# Patient Record
Sex: FEMALE | Race: WHITE | NOT HISPANIC OR LATINO | Employment: STUDENT | URBAN - METROPOLITAN AREA
[De-identification: names, ages, dates, MRNs, and addresses within clinical notes are randomized per-mention and may not be internally consistent; named-entity substitution may affect disease eponyms.]

---

## 2017-10-20 ENCOUNTER — OFFICE VISIT (OUTPATIENT)
Dept: OBSTETRICS AND GYNECOLOGY | Age: 22
End: 2017-10-20

## 2017-10-20 VITALS
WEIGHT: 164 LBS | BODY MASS INDEX: 23.48 KG/M2 | SYSTOLIC BLOOD PRESSURE: 118 MMHG | DIASTOLIC BLOOD PRESSURE: 80 MMHG | HEIGHT: 70 IN

## 2017-10-20 DIAGNOSIS — Z01.419 WELL WOMAN EXAM WITH ROUTINE GYNECOLOGICAL EXAM: Primary | ICD-10-CM

## 2017-10-20 DIAGNOSIS — Z11.3 SCREEN FOR STD (SEXUALLY TRANSMITTED DISEASE): ICD-10-CM

## 2017-10-20 DIAGNOSIS — Z30.011 ENCOUNTER FOR INITIAL PRESCRIPTION OF CONTRACEPTIVE PILLS: ICD-10-CM

## 2017-10-20 PROCEDURE — 99385 PREV VISIT NEW AGE 18-39: CPT | Performed by: OBSTETRICS & GYNECOLOGY

## 2017-10-20 RX ORDER — SERTRALINE HYDROCHLORIDE 25 MG/1
25 TABLET, FILM COATED ORAL DAILY
COMMUNITY
End: 2018-05-15

## 2017-10-20 NOTE — PROGRESS NOTES
Chief complaint:annual  Subjective   History of Present Illness    Neva Whitaker is a 21 y.o.  who presents for annual exam. New Gyn apt.  Her menses are regular every 28-30 days, lasting 7 days. +menorrhagia for 3 days. +dysmenorrhea. Sexually active, uses condoms for contraception.  S/p Gardasil vaccine x3    Soc Hx- from [place], [place]. College student at [employer], psychology major, plans physical therapy program    Obstetric History:  OB History      Para Term  AB Living    0 0 0 0 0 0    SAB TAB Ectopic Multiple Live Births    0 0 0 0 0         Menstrual History:  Menarche age: 11 years  Patient's last menstrual period was 10/02/2017 (approximate).         Current contraception: condoms  History of abnormal Pap smear: no  Received Gardasil immunization: yes -    Perform regular self breast exam: yes -    Family history of uterine or ovarian cancer: no  Family History of colon cancer: no  Family history of breast cancer: no    Mammogram: not indicated.  Colonoscopy: not indicated.  DEXA: not indicated.    Exercise: very active  Calcium/Vitamin D: adequate intake    The following portions of the patient's history were reviewed and updated as appropriate: allergies, current medications, past family history, past medical history, past social history, past surgical history and problem list.    Review of Systems   Constitutional: Negative for activity change, fatigue, fever and unexpected weight change.   Respiratory: Negative for chest tightness and shortness of breath.    Cardiovascular: Negative for chest pain, palpitations and leg swelling.   Gastrointestinal: Negative for abdominal distention, abdominal pain, blood in stool, constipation, diarrhea, nausea and vomiting.   Endocrine: Negative for cold intolerance, heat intolerance, polydipsia, polyphagia and polyuria.   Genitourinary: Negative.    Musculoskeletal: Negative for arthralgias and back pain.   Skin: Negative for color change.  "  Neurological: Negative for weakness and headaches.   Hematological: Does not bruise/bleed easily.   Psychiatric/Behavioral: Negative for confusion.       Pertinent items are noted in HPI.     Objective   Physical Exam    /80  Ht 70\" (177.8 cm)  Wt 164 lb (74.4 kg)  LMP 10/02/2017 (Approximate)  BMI 23.53 kg/m2    General:   alert, appears stated age and cooperative   Neck: no asymmetry, masses, or scars   Heart: regular rate and rhythm, S1, S2 normal, no murmur, click, rub or gallop   Lungs: clear to auscultation bilaterally   Abdomen: soft, non-tender, without masses or organomegaly   Breast: inspection negative, no nipple discharge or bleeding, no masses or nodularity palpable   Vulva: normal, Bartholin's, Urethra, [place]'s normal   Vagina: normal mucosa   Cervix: no bleeding following Pap, no cervical motion tenderness, no lesions and nulliparous appearance   Uterus: normal size, anteverted, non-tender, normal shape and consistency   Adnexa: normal adnexa and no mass, fullness, tenderness   Rectal: not indicated     Assessment/Plan   Neva was seen today for gynecologic exam.    Diagnoses and all orders for this visit:    Well woman exam with routine gynecological exam  -     Pap Lb, Ct-Ng, rfx HPV ASCU - ThinPrep Vial, Cervix, Endocervix    Encounter for initial prescription of contraceptive pills  -     Norethin-Eth Estrad-Fe Biphas 1 MG-10 MCG / 10 MCG tablet; Take 1 tablet by mouth Daily.    Screen for STD (sexually transmitted disease)  -     Pap Lb, Ct-Ng, rfx HPV ASCU - ThinPrep Vial, Cervix, Endocervix        Breast self exam technique reviewed and patient encouraged to perform self-exam monthly.  Discussed healthy lifestyle modifications.  Pap smear sent                 "

## 2017-10-24 LAB
C TRACH RRNA CVX QL NAA+PROBE: NEGATIVE
CONV .: NORMAL
CYTOLOGIST CVX/VAG CYTO: NORMAL
DX ICD CODE: NORMAL
HIV 1 & 2 AB SER-IMP: NORMAL
N GONORRHOEA RRNA CVX QL NAA+PROBE: NEGATIVE
OTHER STN SPEC: NORMAL
PATH REPORT.FINAL DX SPEC: NORMAL
STAT OF ADQ CVX/VAG CYTO-IMP: NORMAL

## 2017-12-05 ENCOUNTER — OFFICE VISIT (OUTPATIENT)
Dept: OBSTETRICS AND GYNECOLOGY | Age: 22
End: 2017-12-05

## 2017-12-05 VITALS
SYSTOLIC BLOOD PRESSURE: 110 MMHG | BODY MASS INDEX: 23.77 KG/M2 | DIASTOLIC BLOOD PRESSURE: 70 MMHG | HEIGHT: 70 IN | WEIGHT: 166 LBS

## 2017-12-05 DIAGNOSIS — Z30.09 BIRTH CONTROL COUNSELING: Primary | ICD-10-CM

## 2017-12-05 PROCEDURE — 99213 OFFICE O/P EST LOW 20 MIN: CPT | Performed by: NURSE PRACTITIONER

## 2017-12-05 RX ORDER — MISOPROSTOL 200 UG/1
TABLET ORAL
Qty: 1 TABLET | Refills: 0 | Status: SHIPPED | OUTPATIENT
Start: 2017-12-05 | End: 2017-12-13

## 2017-12-05 NOTE — PROGRESS NOTES
"Subjective   Neva Whitaker is a 22 y.o. female is being seen today for   Chief Complaint   Patient presents with   • Follow-up     Discuss birth control pills   .    History of Present Illness     Patient started on OCPs in October but stopped taking them and is here to discuss other options.  She didn't like how they made her feel and her periods were irregular on them.  She needs good birth control but doesn't want something she has to take daily. Her mother suggested the IUD and she is very interested in that.  She has one current, new partner and is agreeable to STD testing today but prefers not to have to have an exam.  She did have an AE in October and a pap as well and did fine with the exam.    The following portions of the patient's history were reviewed and updated as appropriate: allergies, current medications, past family history, past medical history, past social history, past surgical history and problem list.    /70  Ht 177.8 cm (70\")  Wt 75.3 kg (166 lb)  LMP 11/06/2017  BMI 23.82 kg/m2      Review of Systems   Constitutional: Negative.    HENT: Negative.    Eyes: Negative.    Respiratory: Negative.    Cardiovascular: Negative.    Gastrointestinal: Negative.    Endocrine: Negative.    Genitourinary: Negative.    Musculoskeletal: Negative.    Skin: Negative.    Allergic/Immunologic: Negative.    Neurological: Negative.    Hematological: Negative.    Psychiatric/Behavioral: Negative.        Objective   Physical Exam   Constitutional: She is oriented to person, place, and time. She appears well-developed and well-nourished.   Pulmonary/Chest: Effort normal.   Abdominal: Soft. Bowel sounds are normal.   Neurological: She is alert and oriented to person, place, and time.   Skin: Skin is warm and dry.   Psychiatric: She has a normal mood and affect. Her behavior is normal.         Assessment/Plan   Neva was seen today for follow-up.    Diagnoses and all orders for this visit:    Birth control " counseling  -     Chlamydia trachomatis, Neisseria gonorrhoeae, PCR w/ confirmation - Urine, Urine, Clean Catch    Other orders  -     misoprostol (CYTOTEC) 200 MCG tablet; Insert 1 tablet into the vagina the night before procedure.      Discussed risks/benefits of IUDs and detailed procedure.  She would like cytotec prior to insertion.  Understands she needs to abstain from unprotected IC x 2 weeks prior to insertion.  GC/CHL sent today.  Can return with menses for insertion.

## 2017-12-13 ENCOUNTER — OFFICE VISIT (OUTPATIENT)
Dept: OBSTETRICS AND GYNECOLOGY | Age: 22
End: 2017-12-13

## 2017-12-13 ENCOUNTER — PROCEDURE VISIT (OUTPATIENT)
Dept: OBSTETRICS AND GYNECOLOGY | Age: 22
End: 2017-12-13

## 2017-12-13 VITALS
SYSTOLIC BLOOD PRESSURE: 110 MMHG | WEIGHT: 169 LBS | DIASTOLIC BLOOD PRESSURE: 68 MMHG | HEIGHT: 70 IN | BODY MASS INDEX: 24.2 KG/M2

## 2017-12-13 DIAGNOSIS — Z30.430 ENCOUNTER FOR IUD INSERTION: Primary | ICD-10-CM

## 2017-12-13 DIAGNOSIS — Z30.431 IUD CHECK UP: Primary | ICD-10-CM

## 2017-12-13 LAB
B-HCG UR QL: NEGATIVE
INTERNAL NEGATIVE CONTROL: NEGATIVE
INTERNAL POSITIVE CONTROL: POSITIVE
Lab: NORMAL

## 2017-12-13 PROCEDURE — 58300 INSERT INTRAUTERINE DEVICE: CPT | Performed by: PHYSICIAN ASSISTANT

## 2017-12-13 PROCEDURE — 76830 TRANSVAGINAL US NON-OB: CPT | Performed by: PHYSICIAN ASSISTANT

## 2017-12-13 PROCEDURE — 81025 URINE PREGNANCY TEST: CPT | Performed by: PHYSICIAN ASSISTANT

## 2017-12-13 NOTE — PROGRESS NOTES
IUD Insertion    Patient's last menstrual period was 12/09/2017 (exact date).    Date of procedure:  12/13/2017    Risks and benefits discussed? yes  All questions answered? yes  Consents given by The patient  Written consent obtained? yes    Procedure documentation:     Urine pregnancy test was done today and result was negative.  The risks (including infection,  bleeding, pain, and uterine perforation) and benefits of the procedure were explained to the patient and Written informed consent was  obtained.    After verifying the patient had a low probability of being pregnant and met the criteria for insertion, a sterile speculum has placed and the cervix was cleansed with an antiseptic solution.  Vaginal discharge was scant.  The anterior lip of the cervix was grasped with an simran and the uterine cavity was gently sounded. There was no difficulty passing the sound through the cervix.  Cervical dilation did not need to be performed prior to placing the IUD.  The uterus was anteverted and sounded to 8 cms.  The Kyleena was then prepared per the manufacturers instructions.    The Kyleena was advanced to a point 2 cms from the fundus and then the arms were released from the sheath.  The device was advanced to the fundus and the device was released fully from the sheath.. The string was cut 2 cms in length.  Bleeding from the cervix was scant.    She tolerated the procedure without any difficulty.    Post procedure instructions: The patient was advised to call for any fever or for prolonged or severe pain or bleeding.. Enc 2 wks of pelvic rest as well. And enc condoms when SA    Follow up needed: 6 weeks for IUD check    U/S done to confirm placement , IUD in cavity. Pt had some lightheadedness occur while checking out. Ibuprofen and sprite were given and pt rested.

## 2017-12-15 ENCOUNTER — TELEPHONE (OUTPATIENT)
Dept: OBSTETRICS AND GYNECOLOGY | Age: 22
End: 2017-12-15

## 2017-12-15 LAB
C TRACH RRNA SPEC QL NAA+PROBE: POSITIVE
C TRACH RRNA SPEC QL NAA+PROBE: POSITIVE
N GONORRHOEA RRNA SPEC QL NAA+PROBE: NEGATIVE

## 2017-12-15 RX ORDER — AZITHROMYCIN 500 MG/1
1000 TABLET, FILM COATED ORAL ONCE
Qty: 2 TABLET | Refills: 0 | Status: SHIPPED | OUTPATIENT
Start: 2017-12-15 | End: 2017-12-15

## 2017-12-15 RX ORDER — AZITHROMYCIN 500 MG/1
1000 TABLET, FILM COATED ORAL DAILY
Qty: 2 TABLET | Refills: 0 | Status: SHIPPED | OUTPATIENT
Start: 2017-12-15 | End: 2018-01-05

## 2017-12-15 NOTE — TELEPHONE ENCOUNTER
----- Message from MAIKEL Kim sent at 12/15/2017  4:24 PM EST -----  Notify + chlamydia.  It is very important to treat especially since she has an IUD.  COnfirm no allergies and I will send in azithromycin for her to take.  Her partner needs to be treated as well and they should abstain from IC x 1 week.  RTO in 3-4 weeks for MARIEL.

## 2017-12-15 NOTE — TELEPHONE ENCOUNTER
Pt.notified of results and that a rx will be sent to the pharmacy.She will return in 3-4 wks for MARIEL  appt.

## 2018-01-05 ENCOUNTER — OFFICE VISIT (OUTPATIENT)
Dept: OBSTETRICS AND GYNECOLOGY | Age: 23
End: 2018-01-05

## 2018-01-05 VITALS — SYSTOLIC BLOOD PRESSURE: 108 MMHG | DIASTOLIC BLOOD PRESSURE: 70 MMHG

## 2018-01-05 DIAGNOSIS — Z30.431 IUD CHECK UP: ICD-10-CM

## 2018-01-05 DIAGNOSIS — Z20.2 CHLAMYDIA CONTACT, TREATED: Primary | ICD-10-CM

## 2018-01-05 DIAGNOSIS — Z20.2 TRICHOMONAS CONTACT, TREATED: ICD-10-CM

## 2018-01-05 PROCEDURE — 99213 OFFICE O/P EST LOW 20 MIN: CPT | Performed by: PHYSICIAN ASSISTANT

## 2018-01-05 NOTE — PROGRESS NOTES
Subjective     Chief Complaint   Patient presents with   • Follow-up     mariel       Neva Whitaker is a 22 y.o.  whose LMP is Patient's last menstrual period was 2017 (exact date). presents for her MARIEL. She tested + for chlamydia and trich. She had this test done on the 5th Dec but then had her IUD placed on the  and the results were not back yet. They resulted on the . She was given meds and took them and told her partner. She was not and is not sxatic. Denies pelvic pain or fever. She did have some cramping but that was r/t the IUD. She does admit to multiple partners but says she is monogamous currently.      She is a pt of Dr Donohue. She is utd on her annual.        No Additional Complaints Reported    The following portions of the patient's history were reviewed and updated as appropriate:vital signs, allergies, current medications, past family history, past medical history, past social history, past surgical history and problem list      Review of Systems   A comprehensive review of systems was negative except for: recent +trich and +chlamydia     Objective      /70  LMP 2017 (Exact Date)    Physical Exam    General:   alert, comfortable and no distress   Heart: Not performed today   Lungs: Not performed today.   Breast: Not performed today   Neck: na   Abdomen: {Not performed today   CVA: Not performed today   Pelvis: External genitalia: normal general appearance  Vaginal: discharge, white   Cervix: IUD string visualized   Extremities: Not performed today   Neurologic: negative   Psychiatric: Normal affect, judgement, and mood       Lab Review   Labs: +trich/chlamydia     Imaging   No data reviewed    Assessment/Plan     ASSESSMENT  1. Chlamydia contact, treated    2. Trichomonas contact, treated    3. IUD check up        PLAN  1.   Orders Placed This Encounter   Procedures   • Chlamydia trachomatis, Neisseria gonorrhoeae, Trichomonas vaginalis, PCR - Swab, Vagina   • RPR   •  Hepatitis B surface antigen   • Hepatitis C antibody   • HIV-1 / O / 2 Ag / Antibody 4th Generation       2. Disc STD risk and encouraged her to be monogamous and use condoms when SA. If she plans to be with one partner, then encourage testing on both sides to confirm no STD's prior to intercourse.  Pt agreeable to this POC.      3. IUD in place, f/u prn or if any problems        Follow up: 9 month(s)    SHERMAN Murrell  1/5/2018

## 2018-01-06 LAB
HBV SURFACE AG SERPL QL IA: NEGATIVE
HCV AB S/CO SERPL IA: <0.1 S/CO RATIO (ref 0–0.9)
HIV 1+2 AB+HIV1 P24 AG SERPL QL IA: NON REACTIVE
RPR SER QL: NORMAL

## 2018-01-08 ENCOUNTER — TELEPHONE (OUTPATIENT)
Dept: OBSTETRICS AND GYNECOLOGY | Age: 23
End: 2018-01-08

## 2018-01-08 LAB
C TRACH RRNA SPEC QL NAA+PROBE: NEGATIVE
N GONORRHOEA RRNA SPEC QL NAA+PROBE: NEGATIVE
T VAGINALIS RRNA SPEC QL NAA+PROBE: NEGATIVE

## 2018-01-08 NOTE — TELEPHONE ENCOUNTER
----- Message from SHERMAN Rothman sent at 1/8/2018  1:26 PM EST -----  Let her know her STD results are negative

## 2018-05-15 ENCOUNTER — OFFICE VISIT (OUTPATIENT)
Dept: OBSTETRICS AND GYNECOLOGY | Age: 23
End: 2018-05-15

## 2018-05-15 VITALS
BODY MASS INDEX: 25.05 KG/M2 | SYSTOLIC BLOOD PRESSURE: 122 MMHG | WEIGHT: 175 LBS | HEIGHT: 70 IN | DIASTOLIC BLOOD PRESSURE: 72 MMHG

## 2018-05-15 DIAGNOSIS — N94.9 VAGINAL BURNING: Primary | ICD-10-CM

## 2018-05-15 DIAGNOSIS — Z11.3 SCREEN FOR STD (SEXUALLY TRANSMITTED DISEASE): ICD-10-CM

## 2018-05-15 LAB
BILIRUB BLD-MCNC: NEGATIVE MG/DL
CLARITY, POC: CLEAR
COLOR UR: YELLOW
GLUCOSE UR STRIP-MCNC: NEGATIVE MG/DL
KETONES UR QL: NEGATIVE
LEUKOCYTE EST, POC: NEGATIVE
NITRITE UR-MCNC: NEGATIVE MG/ML
PH UR: 5.5 [PH] (ref 5–8)
PROT UR STRIP-MCNC: NEGATIVE MG/DL
RBC # UR STRIP: NEGATIVE /UL
SP GR UR: 1.02 (ref 1–1.03)
UROBILINOGEN UR QL: NORMAL

## 2018-05-15 PROCEDURE — 99213 OFFICE O/P EST LOW 20 MIN: CPT | Performed by: PHYSICIAN ASSISTANT

## 2018-05-15 PROCEDURE — 81002 URINALYSIS NONAUTO W/O SCOPE: CPT | Performed by: PHYSICIAN ASSISTANT

## 2018-05-15 NOTE — PROGRESS NOTES
"Subjective     Chief Complaint   Patient presents with   • Vaginitis     c/o itching and burning after intercourse., wants to make sure that she is ok. used refresh and did get a little better.       Neva Whitaker is a 22 y.o.  whose LMP is Patient's last menstrual period was 2018 (approximate). presents with one time of vaginal itching. It happened after a shower but she also notes that she has a new partner and has not been using a condom so she would like to have STD testing done.    She denies any med changes. She is home from school and working at [employer] in [place]      No Additional Complaints Reported    The following portions of the patient's history were reviewed and updated as appropriate:vital signs, allergies, current medications, past family history, past medical history, past social history, past surgical history and problem list      Review of Systems   Genitourinary:positive for vaginal itch     Objective      /72   Ht 177.8 cm (70\")   Wt 79.4 kg (175 lb)   LMP 2018 (Approximate)   BMI 25.11 kg/m²     Physical Exam    General:   alert, comfortable and no distress   Heart: Not performed today   Lungs: Not performed today.   Breast: Not performed today   Neck: na   Abdomen: {Not performed today   CVA: Not performed today   Pelvis: Vaginal: normal mucosa without prolapse or lesions and scant white d/ct noted  Cervix: IUD string visualized   Extremities: Not performed today   Neurologic: negative   Psychiatric: Normal affect, judgement, and mood       Lab Review   Labs: No data reviewed     Imaging   No data reviewed    Assessment/Plan     ASSESSMENT  1. Vaginal burning    2. Screen for STD (sexually transmitted disease)        PLAN  1.   Orders Placed This Encounter   Procedures   • NuSwab VG+ - Swab, Vagina   • POC Urinalysis Dipstick, Multipro       2. Plan cultures first. Re emphasized need for condoms.      Follow up: 7 month(s)    An Gagnon, " PA  5/15/2018

## 2018-05-20 LAB
A VAGINAE DNA VAG QL NAA+PROBE: ABNORMAL SCORE
BVAB2 DNA VAG QL NAA+PROBE: ABNORMAL SCORE
C ALBICANS DNA VAG QL NAA+PROBE: POSITIVE
C GLABRATA DNA VAG QL NAA+PROBE: NEGATIVE
C TRACH RRNA SPEC QL NAA+PROBE: NEGATIVE
MEGA1 DNA VAG QL NAA+PROBE: ABNORMAL SCORE
N GONORRHOEA RRNA SPEC QL NAA+PROBE: NEGATIVE
T VAGINALIS RRNA SPEC QL NAA+PROBE: NEGATIVE

## 2018-05-21 ENCOUNTER — TELEPHONE (OUTPATIENT)
Dept: OBSTETRICS AND GYNECOLOGY | Age: 23
End: 2018-05-21

## 2018-05-21 RX ORDER — FLUCONAZOLE 150 MG/1
150 TABLET ORAL ONCE
Qty: 2 TABLET | Refills: 0 | Status: SHIPPED | OUTPATIENT
Start: 2018-05-21 | End: 2018-07-14 | Stop reason: SDUPTHER

## 2018-05-21 RX ORDER — METRONIDAZOLE 500 MG/1
500 TABLET ORAL 2 TIMES DAILY
Qty: 14 TABLET | Refills: 0 | Status: SHIPPED | OUTPATIENT
Start: 2018-05-21 | End: 2018-07-14 | Stop reason: SDUPTHER

## 2018-05-21 NOTE — TELEPHONE ENCOUNTER
----- Message from SHERMAN Rothman sent at 5/21/2018  9:08 AM EDT -----  Let her know her vaginal swab is positive for yeast and BV, but neg for STD's. I sent in meds

## 2018-07-16 ENCOUNTER — TELEPHONE (OUTPATIENT)
Dept: OBSTETRICS AND GYNECOLOGY | Age: 23
End: 2018-07-16

## 2018-07-16 RX ORDER — METRONIDAZOLE 7.5 MG/G
GEL VAGINAL
Qty: 70 G | Refills: 0 | Status: SHIPPED | OUTPATIENT
Start: 2018-07-16 | End: 2020-02-07

## 2018-07-16 RX ORDER — METRONIDAZOLE 500 MG/1
TABLET ORAL
Qty: 14 TABLET | Refills: 0 | Status: SHIPPED | OUTPATIENT
Start: 2018-07-16 | End: 2018-07-16 | Stop reason: SINTOL

## 2018-07-16 RX ORDER — FLUCONAZOLE 150 MG/1
150 TABLET ORAL ONCE
Qty: 2 TABLET | Refills: 0 | Status: SHIPPED | OUTPATIENT
Start: 2018-07-16 | End: 2018-07-16

## 2018-07-16 NOTE — TELEPHONE ENCOUNTER
Dr Lazcano pt, was given metronidazole that made her feel sick all the time, pt requesting we send in Metro Gel to pharm on file, sumeet

## 2018-07-23 ENCOUNTER — TELEPHONE (OUTPATIENT)
Dept: OBSTETRICS AND GYNECOLOGY | Age: 23
End: 2018-07-23

## 2018-07-23 NOTE — TELEPHONE ENCOUNTER
Dr Lazcano pt, had IUD inserted in Dec 2017, exp cramping mild and hot flashes and back ache, this has never happened before, LMP ?2 mos ago?  Took plan B, pt could not tell me when ?over a month ago, did a preg test 2 wks ago - negative. Also is on Metrogel, how long to wait before having intercourse. Please advise.

## 2018-07-23 NOTE — TELEPHONE ENCOUNTER
Complete metrogel before attempting IC.  As for the cramping and hot flashes, could be a SE of the IUD but pretty unusual.  Have her check her strings, r/o pregnancy and monitor s/s. If they persist, we can plan a problem visit with u/s to confirm location and possibly order some labs as well

## 2019-11-25 ENCOUNTER — TELEPHONE (OUTPATIENT)
Dept: OBSTETRICS AND GYNECOLOGY | Age: 24
End: 2019-11-25

## 2019-11-25 RX ORDER — METRONIDAZOLE 500 MG/1
500 TABLET ORAL 2 TIMES DAILY
Qty: 14 TABLET | Refills: 0 | Status: SHIPPED | OUTPATIENT
Start: 2019-11-25 | End: 2019-12-02

## 2020-02-07 ENCOUNTER — OFFICE VISIT (OUTPATIENT)
Dept: OBSTETRICS AND GYNECOLOGY | Age: 25
End: 2020-02-07

## 2020-02-07 VITALS
SYSTOLIC BLOOD PRESSURE: 110 MMHG | WEIGHT: 178.4 LBS | DIASTOLIC BLOOD PRESSURE: 62 MMHG | BODY MASS INDEX: 25.54 KG/M2 | HEIGHT: 70 IN

## 2020-02-07 DIAGNOSIS — Z11.3 SCREEN FOR STD (SEXUALLY TRANSMITTED DISEASE): ICD-10-CM

## 2020-02-07 DIAGNOSIS — Z01.419 WELL WOMAN EXAM WITH ROUTINE GYNECOLOGICAL EXAM: Primary | ICD-10-CM

## 2020-02-07 DIAGNOSIS — B96.89 BV (BACTERIAL VAGINOSIS): ICD-10-CM

## 2020-02-07 DIAGNOSIS — N76.0 BV (BACTERIAL VAGINOSIS): ICD-10-CM

## 2020-02-07 PROCEDURE — 99395 PREV VISIT EST AGE 18-39: CPT | Performed by: OBSTETRICS & GYNECOLOGY

## 2020-02-07 RX ORDER — CLINDAMYCIN PHOSPHATE 100 MG/5G
CREAM VAGINAL
Qty: 5 G | Refills: 2 | Status: SHIPPED | OUTPATIENT
Start: 2020-02-07 | End: 2021-04-09

## 2020-02-07 RX ORDER — TINIDAZOLE 500 MG/1
TABLET ORAL
Qty: 10 TABLET | Refills: 3 | Status: SHIPPED | OUTPATIENT
Start: 2020-02-07 | End: 2020-09-17

## 2020-02-11 LAB
C TRACH RRNA CVX QL NAA+PROBE: NEGATIVE
CONV .: NORMAL
CYTOLOGIST CVX/VAG CYTO: NORMAL
CYTOLOGY CVX/VAG DOC CYTO: NORMAL
CYTOLOGY CVX/VAG DOC THIN PREP: NORMAL
DX ICD CODE: NORMAL
HIV 1 & 2 AB SER-IMP: NORMAL
N GONORRHOEA RRNA CVX QL NAA+PROBE: NEGATIVE
OTHER STN SPEC: NORMAL
STAT OF ADQ CVX/VAG CYTO-IMP: NORMAL

## 2020-02-12 ENCOUNTER — TELEPHONE (OUTPATIENT)
Dept: OBSTETRICS AND GYNECOLOGY | Age: 25
End: 2020-02-12

## 2020-03-02 ENCOUNTER — TELEPHONE (OUTPATIENT)
Dept: OBSTETRICS AND GYNECOLOGY | Age: 25
End: 2020-03-02

## 2020-03-02 NOTE — TELEPHONE ENCOUNTER
(Sheron pt) Pt was suppose to have a medication called in boric acid. Is this something we can send in?     [phone]

## 2020-03-04 NOTE — TELEPHONE ENCOUNTER
She has to go to a compounding pharmacy for this. I usually give a paper prescription for that at the time of the visit if she is considering trying this. [facility] close to our office is a compounding pharmacy. If she doesn't have the paper prescription, we can call it in there for her if she wants to try it.

## 2020-09-14 ENCOUNTER — OFFICE VISIT (OUTPATIENT)
Dept: OBSTETRICS AND GYNECOLOGY | Age: 25
End: 2020-09-14

## 2020-09-14 VITALS
DIASTOLIC BLOOD PRESSURE: 74 MMHG | SYSTOLIC BLOOD PRESSURE: 126 MMHG | HEIGHT: 70 IN | WEIGHT: 173 LBS | BODY MASS INDEX: 24.77 KG/M2

## 2020-09-14 DIAGNOSIS — Z11.3 SCREEN FOR STD (SEXUALLY TRANSMITTED DISEASE): ICD-10-CM

## 2020-09-14 DIAGNOSIS — Z30.431 IUD CHECK UP: ICD-10-CM

## 2020-09-14 DIAGNOSIS — N91.2 ABSENCE OF MENSTRUATION: Primary | ICD-10-CM

## 2020-09-14 PROCEDURE — 81025 URINE PREGNANCY TEST: CPT | Performed by: PHYSICIAN ASSISTANT

## 2020-09-14 PROCEDURE — 99213 OFFICE O/P EST LOW 20 MIN: CPT | Performed by: PHYSICIAN ASSISTANT

## 2020-09-14 RX ORDER — LEVONORGESTREL 19.5 MG/1
1 INTRAUTERINE DEVICE INTRAUTERINE ONCE
COMMUNITY
End: 2022-09-08

## 2020-09-14 NOTE — PROGRESS NOTES
"Subjective     Chief Complaint   Patient presents with   • Gynecologic Exam     wants strings checked on iud and std testing. (doesn't have period just symptoms) wants to make sure that is ok.       Neva Whitaker is a 24 y.o.  whose LMP is No LMP recorded. Patient has had an implant. presents for IUD check  She is happy with the IUD  Has no menses, wants to make sure that is ok  Hasn't had a period in a long time  IUD placed in Dec 2017  Has tried to check strings but can't feel them    No longer in relationship with previous BF  He was unpleasant and it was based on sex  Is dating someone new and he treats her really well  She is living back home with her Mom  Works at [employer]    Would like STD testing today as well  Last seen for AE in Feb by Dr Holbrook  IUD strings seen then        No Additional Complaints Reported    The following portions of the patient's history were reviewed and updated as appropriate:vital signs, allergies, current medications, past family history, past medical history, past social history, past surgical history and problem list      Review of Systems   Genitourinary:positive for screen for std testing     Objective      /74   Ht 177.8 cm (70\")   Wt 78.5 kg (173 lb)   BMI 24.82 kg/m²     Physical Exam     General:   alert, comfortable and in no distress   Heart: Not performed today   Lungs: Not performed today.   Breast: Not performed today   Neck: na   Abdomen: {Not performed today   CVA: Not performed today   Pelvis: External genitalia: normal general appearance  Vaginal: normal mucosa without prolapse or lesions  Cervix: GC prep obtained and IUD string visualized  Adnexa: normal bimanual exam  Uterus: normal single, nontender   Extremities: Not performed today   Neurologic: negative   Psychiatric: Normal affect, judgement, and mood       Lab Review   Labs: UPT    Imaging   No data reviewed    Assessment/Plan     ASSESSMENT  1. Absence of menstruation    2. Screen for STD " (sexually transmitted disease)    3. IUD check up        PLAN  1.   Orders Placed This Encounter   Procedures   • Chlamydia trachomatis, Neisseria gonorrhoeae, Trichomonas vaginalis, PCR - Swab, Cervix   • POC Pregnancy, Urine       2. Std testing done today. UPT done as well and reassured pt that no menses is not unusual with IUD.  Enc condoms if SA with new partner. F/u in Feb for annual or sooner prn    Follow up: 5 month(s)    SHERMAN Murrell  9/14/2020

## 2020-09-16 LAB
C TRACH RRNA SPEC QL NAA+PROBE: NEGATIVE
N GONORRHOEA RRNA SPEC QL NAA+PROBE: NEGATIVE
T VAGINALIS DNA SPEC QL NAA+PROBE: POSITIVE

## 2020-09-17 ENCOUNTER — TELEPHONE (OUTPATIENT)
Dept: OBSTETRICS AND GYNECOLOGY | Age: 25
End: 2020-09-17

## 2020-09-17 RX ORDER — METRONIDAZOLE 500 MG/1
500 TABLET ORAL 2 TIMES DAILY
Qty: 14 TABLET | Refills: 0 | Status: SHIPPED | OUTPATIENT
Start: 2020-09-17 | End: 2020-09-24

## 2020-09-17 NOTE — TELEPHONE ENCOUNTER
Pt called wanting to know if a prescription can also be called in for her partner since she was positive for trich.    Please advise     [phone]

## 2020-09-17 NOTE — TELEPHONE ENCOUNTER
I can't call in the prescription for him as he is not our patient. He can get a prescription from his primary doctor or an urgent care.

## 2020-09-22 ENCOUNTER — TELEPHONE (OUTPATIENT)
Dept: OBSTETRICS AND GYNECOLOGY | Age: 25
End: 2020-09-22

## 2020-09-22 RX ORDER — TINIDAZOLE 500 MG/1
2 TABLET ORAL ONCE
Qty: 4 TABLET | Refills: 0 | Status: SHIPPED | OUTPATIENT
Start: 2020-09-22 | End: 2020-09-22

## 2020-09-22 NOTE — TELEPHONE ENCOUNTER
Patient of Dr. Holbrook called she prescribed Flagyl for her to take however patient wants to know if there's something she can take vaginally instead.  Please advise.

## 2020-09-22 NOTE — TELEPHONE ENCOUNTER
For the infection she has, she needs the oral treatment. If she doesn't tolerate flagyl, I can send in tindamax which can be taken for a shorter period of time

## 2020-11-10 PROCEDURE — U0003 INFECTIOUS AGENT DETECTION BY NUCLEIC ACID (DNA OR RNA); SEVERE ACUTE RESPIRATORY SYNDROME CORONAVIRUS 2 (SARS-COV-2) (CORONAVIRUS DISEASE [COVID-19]), AMPLIFIED PROBE TECHNIQUE, MAKING USE OF HIGH THROUGHPUT TECHNOLOGIES AS DESCRIBED BY CMS-2020-01-R: HCPCS | Performed by: FAMILY MEDICINE

## 2021-04-09 ENCOUNTER — OFFICE VISIT (OUTPATIENT)
Dept: OBSTETRICS AND GYNECOLOGY | Age: 26
End: 2021-04-09

## 2021-04-09 VITALS
SYSTOLIC BLOOD PRESSURE: 112 MMHG | BODY MASS INDEX: 25.18 KG/M2 | HEIGHT: 69 IN | WEIGHT: 170 LBS | DIASTOLIC BLOOD PRESSURE: 70 MMHG

## 2021-04-09 DIAGNOSIS — Z01.419 WELL WOMAN EXAM WITH ROUTINE GYNECOLOGICAL EXAM: Primary | ICD-10-CM

## 2021-04-09 DIAGNOSIS — Z11.3 SCREEN FOR STD (SEXUALLY TRANSMITTED DISEASE): ICD-10-CM

## 2021-04-09 PROCEDURE — 99395 PREV VISIT EST AGE 18-39: CPT | Performed by: OBSTETRICS & GYNECOLOGY

## 2021-04-09 NOTE — PROGRESS NOTES
"Chief complaint: annual    Subjective   History of Present Illness    Neva Whitaker is a 25 y.o.  who presents for annual exam. Doing well, no gyn c/o. Happy w/ Kylena IUD, it was placed 2017. Was tx for trichomonas 2020 but recheck not done yet. She is a  and is on a strict low carb/high protein diet. She does c/o some abdominal bloating and gas. She does take fiber supplements.  Her menses are absent.  2020 pap normal  Soc hx- graduated 2020 from college, plans masters in PT, has a new boyfriend of 8 mo    Obstetric History:  OB History        0    Para   0    Term   0       0    AB   0    Living   0       SAB   0    TAB   0    Ectopic   0    Molar   0    Multiple   0    Live Births   0               Menstrual History:     No LMP recorded. Patient has had an implant.         Current contraception: IUD  History of abnormal Pap smear: no    Perform regular self breast exam: yes -    Family history of uterine or ovarian cancer: no  Family History of colon cancer: no  Family history of breast cancer: no    Mammogram: not indicated.  Colonoscopy: not indicated.  DEXA: not indicated.    Exercise: moderately active  Calcium/Vitamin D: adequate intake    The following portions of the patient's history were reviewed and updated as appropriate: allergies, current medications, past family history, past medical history, past social history, past surgical history and problem list.    Review of Systems   Constitutional: Negative.    Gastrointestinal:        Abdominal bloating     Genitourinary: Negative.        Pertinent items are noted in HPI.     Objective   Physical Exam    /70   Ht 175.3 cm (69\")   Wt 77.1 kg (170 lb)   BMI 25.10 kg/m²     General:   alert, appears stated age and cooperative   Neck: no asymmetry, masses, or scars   Heart: regular rate and rhythm, S1, S2 normal, no murmur, click, rub or gallop   Lungs: clear to auscultation bilaterally   Abdomen: soft, " non-tender, without masses or organomegaly   Breast: inspection negative, no nipple discharge or bleeding, no masses or nodularity palpable   Vulva: normal, Bartholin's, Urethra, [place]'s normal   Vagina: normal mucosa   Cervix: no cervical motion tenderness, no lesions, nulliparous appearance and IUD string noted   Uterus: normal size, anteverted, mobile, non-tender, normal shape and consistency   Adnexa: normal adnexa and no mass, fullness, tenderness   Rectal: not indicated     Assessment/Plan   Diagnoses and all orders for this visit:    1. Well woman exam with routine gynecological exam (Primary)    2. Screen for STD (sexually transmitted disease)  -     Chlamydia trachomatis, Neisseria gonorrhoeae, Trichomonas vaginalis, PCR - Swab, Cervix    recommend adding dietary fiber through fruits and vegetables as the fiber supplements can cause gas and bloating    Breast self exam technique reviewed and patient encouraged to perform self-exam monthly.  Discussed healthy lifestyle modifications.  Pap smear up to date, plan q 3 yrs

## 2021-04-13 LAB
C TRACH RRNA SPEC QL NAA+PROBE: NEGATIVE
N GONORRHOEA RRNA SPEC QL NAA+PROBE: NEGATIVE
T VAGINALIS DNA SPEC QL NAA+PROBE: NEGATIVE

## 2021-07-15 ENCOUNTER — OFFICE VISIT (OUTPATIENT)
Dept: FAMILY MEDICINE CLINIC | Facility: CLINIC | Age: 26
End: 2021-07-15

## 2021-07-15 VITALS
HEART RATE: 53 BPM | HEIGHT: 69 IN | WEIGHT: 184 LBS | BODY MASS INDEX: 27.25 KG/M2 | OXYGEN SATURATION: 98 % | DIASTOLIC BLOOD PRESSURE: 49 MMHG | SYSTOLIC BLOOD PRESSURE: 110 MMHG | TEMPERATURE: 98.6 F | RESPIRATION RATE: 16 BRPM

## 2021-07-15 DIAGNOSIS — G89.29 CHRONIC BILATERAL LOW BACK PAIN WITHOUT SCIATICA: Primary | ICD-10-CM

## 2021-07-15 DIAGNOSIS — F41.9 ANXIETY: ICD-10-CM

## 2021-07-15 DIAGNOSIS — M54.50 CHRONIC BILATERAL LOW BACK PAIN WITHOUT SCIATICA: Primary | ICD-10-CM

## 2021-07-15 PROBLEM — Z00.00 PREVENTATIVE HEALTH CARE: Status: ACTIVE | Noted: 2021-07-15

## 2021-07-15 PROCEDURE — 99213 OFFICE O/P EST LOW 20 MIN: CPT | Performed by: FAMILY MEDICINE

## 2021-07-15 NOTE — PROGRESS NOTES
Subjective   Chief Complaint   Patient presents with   • Establish Care   • Back Pain     Neva Whitaker is a 25 y.o. female.     Patient Care Team:  Chika Marie MD as PCP - General (Family Medicine)  Provider, No Known as PCP - Family Medicine  Jessica Holbrook MD as Gynecologist (Obstetrics and Gynecology)    She is coming in today to get established.  She is currently being treated for anxiety and she tells me that she has been on Zoloft 50 mg for few years and this medication is working for her very well.  She also wants to talk about her chronic back issues, which she has been dealing with for the last few years.  The pain comes and goes and she was previously seen by chiropractor and that seems to help temporarily.  She denies any radiation of the pain down her legs and she denies any numbness or tingling.  She has tried over-the-counter anti-inflammatories.  She denies any injury or fall.  She is very physically active and works out on a regular basis few times a week.  She also would like to get some suggestion for a local GYN doctor as she plans to get  and pregnant down the road.       The following portions of the patient's history were reviewed and updated as appropriate: allergies, current medications, past family history, past medical history, past social history, past surgical history and problem list.  Past Medical History:   Diagnosis Date   • Anxiety    • Chlamydia 2017     Past Surgical History:   Procedure Laterality Date   • BREAST AUGMENTATION     • TONSILLECTOMY       The patient has a family history of  Family History   Problem Relation Age of Onset   • Heart failure Paternal Grandfather    • Colon cancer Maternal Grandmother         76   • Breast cancer Neg Hx    • Ovarian cancer Neg Hx    • Uterine cancer Neg Hx    • Deep vein thrombosis Neg Hx    • Pulmonary embolism Neg Hx      Social History     Socioeconomic History   • Marital status: Single     Spouse name: Not on file  "  • Number of children: Not on file   • Years of education: Not on file   • Highest education level: Not on file   Tobacco Use   • Smoking status: Never Smoker   • Smokeless tobacco: Never Used   Substance and Sexual Activity   • Alcohol use: Yes     Comment: OCC   • Drug use: No   • Sexual activity: Yes     Partners: Male     Birth control/protection: I.U.D.     Comment: Hortensia 12/2017       Review of Systems   Musculoskeletal: Positive for back pain. Negative for arthralgias, gait problem and joint swelling.   Neurological: Negative for weakness and numbness.   Psychiatric/Behavioral: Negative for decreased concentration and sleep disturbance. The patient is nervous/anxious.      Visit Vitals  /49 (BP Location: Right arm, Patient Position: Sitting, Cuff Size: Adult)   Pulse 53   Temp 98.6 °F (37 °C) (Temporal)   Resp 16   Ht 174.6 cm (68.75\")   Wt 83.5 kg (184 lb)   SpO2 98%   BMI 27.37 kg/m²       Current Outpatient Medications:   •  levonorgestrel (Kyleena) 19.5 MG intrauterine device IUD, 1 each by Intrauterine route 1 (One) Time., Disp: , Rfl:   •  sertraline (ZOLOFT) 50 MG tablet, Take 1 tablet by mouth Daily., Disp: 45 tablet, Rfl: 2    Objective   Physical Exam  Constitutional:       General: She is not in acute distress.     Appearance: Normal appearance. She is well-developed. She is not ill-appearing or diaphoretic.      Comments: Patient is in no distress, patient has normal voice and speech.  Normal respiratory effort.   HENT:      Head: Normocephalic and atraumatic.   Pulmonary:      Effort: Pulmonary effort is normal.   Musculoskeletal:      Cervical back: Normal range of motion and neck supple.      Comments: Negative straight leg raising test bilaterally.    Neurological:      General: No focal deficit present.      Mental Status: She is alert and oriented to person, place, and time. Mental status is at baseline.   Psychiatric:         Mood and Affect: Mood normal.         Assessment/Plan "   Diagnoses and all orders for this visit:    1. Chronic bilateral low back pain without sciatica (Primary)  -     XR Spine Lumbar 2 or 3 View; Future    2. Anxiety    Other orders  -     sertraline (ZOLOFT) 50 MG tablet; Take 1 tablet by mouth Daily.  Dispense: 45 tablet; Refill: 2      I reviewed her symptoms and concerns.  Considering her ongoing back issues for few years I will be getting x-ray of the lumbar spine.  I also discussed with her the possibility of referral to physical therapy and she wants to wait on that.  She may take over-the-counter anti-inflammatories as needed.  Her anxiety symptoms seem to be well controlled with Zoloft 50 mg and she may continue that.            Return in about 6 months (around 1/15/2022) for Annual physical.    Requested Prescriptions     Signed Prescriptions Disp Refills   • sertraline (ZOLOFT) 50 MG tablet 45 tablet 2     Sig: Take 1 tablet by mouth Daily.

## 2021-08-19 ENCOUNTER — TELEPHONE (OUTPATIENT)
Dept: FAMILY MEDICINE CLINIC | Facility: CLINIC | Age: 26
End: 2021-08-19

## 2021-08-19 DIAGNOSIS — R41.840 ATTENTION DEFICIT: Primary | ICD-10-CM

## 2021-08-19 NOTE — TELEPHONE ENCOUNTER
This is a fairly new patient for me.  I only saw her once.  If there is a concern for ADHD I recommend to see a psychiatrist for further evaluation and treatment.  If she is interested we can put a referral in.  Thank you.

## 2021-08-19 NOTE — TELEPHONE ENCOUNTER
Caller: KENISHA     Relationship: Other    Best call back number: [phone]    What is the best time to reach you: ANY TIME     Who are you requesting to speak with (clinical staff, provider,  specific staff member): CLINICAL STAFF     What was the call regarding: PATIENT'S FIANCE CALLED IN TODAY WANTING TO ASK DR HANEY ABOUT HOW TO GO ABOUT GETTING PATIENT TESTED FOR ADHD. PATIENT HAS NEVER BEEN DIAGNOSED WITH WITH ADHD, BUT THEY THINK SHE HAS IT    PLEASE ADVISE

## 2021-09-13 ENCOUNTER — TELEPHONE (OUTPATIENT)
Dept: FAMILY MEDICINE CLINIC | Facility: CLINIC | Age: 26
End: 2021-09-13

## 2021-09-13 NOTE — TELEPHONE ENCOUNTER
Caller: Neva Whitaker    Relationship: Self    Best call back number: [phone]     Medication needed:   Requested Prescriptions     Pending Prescriptions Disp Refills   • sertraline (ZOLOFT) 50 MG tablet 45 tablet 2     Sig: Take 1 tablet by mouth Daily.       When do you need the refill by: 9-13-21    Does the patient have less than a 3 day supply:  [x] Yes  [] No    What is the patient's preferred pharmacy: [facility] [phone] [place], [place] [phone]

## 2021-09-13 NOTE — TELEPHONE ENCOUNTER
She already called us about this last month and I already put a referral into her chart to see a psychiatrist for further evaluation and treatment of possible ADD.  Please check on the status of the referral.  Thank you.

## 2021-09-13 NOTE — TELEPHONE ENCOUNTER
I saw her for the first time as a new patient in 7/2021.  At that time she told me she had been treated for anxiety and she was on Zoloft and she stated that her symptoms were well controlled and medication refill was given.  Now she wants behavioral health referral.  Please call the patient to get more information.  Issue requesting a referral to see a psychiatrist or she wants a therapist?  Is there any worsening of her symptoms?  Thank you.

## 2021-09-13 NOTE — TELEPHONE ENCOUNTER
Pt has a had time focusing on things. Her memory is totally off, and when talking with somebody, she get lost in the conversation because she starts thinking in something else unrelated to the conversation and did not even realize. She would like to get evaluated for ADHD/ADD

## 2021-09-21 NOTE — TELEPHONE ENCOUNTER
Caller: Neva Whitaker    Relationship: Self      Medication requested (name and dosage): sertraline (ZOLOFT) 50 MG tablet    Pharmacy where request should be sent [address] - [phone] [facility] [phone] [phone]     [phone] (H)    Best call back number:     Does the patient have less than a 3 day supply:  [x] Yes  [] No    Shaylee Deleon Rep   09/21/21 14:29 EDT

## 2021-10-01 ENCOUNTER — TELEPHONE (OUTPATIENT)
Dept: FAMILY MEDICINE CLINIC | Facility: CLINIC | Age: 26
End: 2021-10-01

## 2021-10-01 NOTE — TELEPHONE ENCOUNTER
Caller: Neva Whitaker    Relationship: Self      Medication requested (name and dosage): sertraline (ZOLOFT) 50 MG tablet    Pharmacy where request should be sent: [facility] [phone] [place], [place] [phone]      Additional details provided by patient: PATIENT STATES PHARMACY DID NOT RECEIVE SCRIPT SENT ON 09/21    Best call back number: [phone] (    Does the patient have less than a 3 day supply:  [x] Yes  [] No    Shaylee Long Rep   10/01/21 13:41 EDT         DELETE AFTER READING TO PATIENT: “Thank you for sharing this information with me. I will send a message to the clinical team. Please allow 48 hours for the clinical staff to follow up on this request.”

## 2021-10-06 ENCOUNTER — TELEPHONE (OUTPATIENT)
Dept: FAMILY MEDICINE CLINIC | Facility: CLINIC | Age: 26
End: 2021-10-06

## 2021-10-06 NOTE — TELEPHONE ENCOUNTER
[facility]?  Are her symptoms any different now?  Did they tell her that it was viral upper respiratory infection as if this is the case antibiotic will not help.  I did not evaluated her for this and I am not sure if the antibiotic is really what she needs.  Please get some clarification from the patient.  Thank you.

## 2021-10-06 NOTE — TELEPHONE ENCOUNTER
Caller: JulianneNeva    Relationship: Self    Best call back number:  [phone]    What medication are you requesting: ANTIBIOTIC     What are your current symptoms: FEVER , HEADACHE , CONGESTION , LYMPH NODES ARE SWOLLEN     How long have you been experiencing symptoms: HAD ABOUT A WEEK     Have you had these symptoms before:    [x] Yes  [] No    Have you been treated for these symptoms before:   [x] Yes  [] No    If a prescription is needed, what is your preferred pharmacy and phone number:  [address] [phone]  - [phone] [phone]    Additional notes: PATIENT WENT TO THE [facility] AND THEY DIAGNOSED HER WITH A UPPER RESPIRATORY INFECTION AND GAVE HER NO ANTIBIOTICS AND WAS TESTED FOR COVID-19 WAS NEGATIVE .

## 2021-10-06 NOTE — TELEPHONE ENCOUNTER
I would like her to make an appointment for evaluation with me, may open the acute slot for Thursday.  Thank you.

## 2021-10-06 NOTE — TELEPHONE ENCOUNTER
PATIENT SAYS SHE W[facility] AND WAS TOLD SHE HAS A URI PER THE PT. SHE HAS A 99.7 FEVER,NON PRODUCTIVE COUGH AND FATIGUED.

## 2021-10-07 NOTE — TELEPHONE ENCOUNTER
Lm on cell voice mail per okayed by patient at last conversation. Needs to make an appt for today

## 2022-01-14 ENCOUNTER — OFFICE VISIT (OUTPATIENT)
Dept: PSYCHIATRY | Facility: CLINIC | Age: 27
End: 2022-01-14

## 2022-01-14 VITALS — SYSTOLIC BLOOD PRESSURE: 116 MMHG | WEIGHT: 197.4 LBS | DIASTOLIC BLOOD PRESSURE: 72 MMHG | BODY MASS INDEX: 29.36 KG/M2

## 2022-01-14 DIAGNOSIS — F98.8 ATTENTION DEFICIT DISORDER (ADD) WITHOUT HYPERACTIVITY: Primary | Chronic | ICD-10-CM

## 2022-01-14 DIAGNOSIS — F41.9 ANXIETY: ICD-10-CM

## 2022-01-14 PROCEDURE — 90792 PSYCH DIAG EVAL W/MED SRVCS: CPT

## 2022-01-14 RX ORDER — DEXTROAMPHETAMINE SACCHARATE, AMPHETAMINE ASPARTATE MONOHYDRATE, DEXTROAMPHETAMINE SULFATE AND AMPHETAMINE SULFATE 2.5; 2.5; 2.5; 2.5 MG/1; MG/1; MG/1; MG/1
10 CAPSULE, EXTENDED RELEASE ORAL EVERY MORNING
Qty: 30 CAPSULE | Refills: 0 | Status: SHIPPED | OUTPATIENT
Start: 2022-01-14 | End: 2022-02-12 | Stop reason: DRUGHIGH

## 2022-02-11 DIAGNOSIS — F98.8 ATTENTION DEFICIT DISORDER (ADD) WITHOUT HYPERACTIVITY: Primary | Chronic | ICD-10-CM

## 2022-02-11 RX ORDER — DEXTROAMPHETAMINE SACCHARATE, AMPHETAMINE ASPARTATE MONOHYDRATE, DEXTROAMPHETAMINE SULFATE AND AMPHETAMINE SULFATE 2.5; 2.5; 2.5; 2.5 MG/1; MG/1; MG/1; MG/1
10 CAPSULE, EXTENDED RELEASE ORAL EVERY MORNING
Qty: 30 CAPSULE | Refills: 0 | Status: CANCELLED | OUTPATIENT
Start: 2022-02-11

## 2022-02-11 NOTE — TELEPHONE ENCOUNTER
Pt says she has been taking 2 Adderall because you told her to do that if she didn't see any change.  She is ready for a refill with the increased dose.

## 2022-02-12 RX ORDER — DEXTROAMPHETAMINE SACCHARATE, AMPHETAMINE ASPARTATE MONOHYDRATE, DEXTROAMPHETAMINE SULFATE AND AMPHETAMINE SULFATE 5; 5; 5; 5 MG/1; MG/1; MG/1; MG/1
20 CAPSULE, EXTENDED RELEASE ORAL DAILY
Qty: 30 CAPSULE | Refills: 0 | Status: SHIPPED | OUTPATIENT
Start: 2022-02-12 | End: 2022-04-27

## 2022-02-22 ENCOUNTER — OFFICE VISIT (OUTPATIENT)
Dept: PSYCHIATRY | Facility: CLINIC | Age: 27
End: 2022-02-22

## 2022-02-22 VITALS — BODY MASS INDEX: 28.32 KG/M2 | WEIGHT: 190.4 LBS

## 2022-02-22 DIAGNOSIS — F41.9 ANXIETY: ICD-10-CM

## 2022-02-22 DIAGNOSIS — F98.8 ATTENTION DEFICIT DISORDER (ADD) WITHOUT HYPERACTIVITY: Primary | Chronic | ICD-10-CM

## 2022-02-22 PROCEDURE — 99214 OFFICE O/P EST MOD 30 MIN: CPT

## 2022-02-22 RX ORDER — ATOMOXETINE 40 MG/1
40 CAPSULE ORAL DAILY
Qty: 60 CAPSULE | Refills: 2 | Status: SHIPPED | OUTPATIENT
Start: 2022-02-22 | End: 2022-05-09

## 2022-03-11 ENCOUNTER — TELEPHONE (OUTPATIENT)
Dept: FAMILY MEDICINE CLINIC | Facility: CLINIC | Age: 27
End: 2022-03-11

## 2022-03-11 NOTE — TELEPHONE ENCOUNTER
Caller: KENISHA HUGHES    Relationship: Emergency Contact    Best call back number: [phone]    What is the best time to reach you: ANY  Who are you requesting to speak with (clinical staff, provider,  specific staff member): CLINICAL    Do you know the name of the person who called: KENISHA    What was the call regarding: PATIENT IS HAVING SUDDEN ONSET OF BACK PAIN AND RIGHT LEG PAIN AND DIARRHEA    KENISHA ASKED FOR SAME DAY APPT NOTHING AVAILABLE TODAY.    WANTED TO SEE IF THERE WAS SOMETHING THAT COULD BE ADVISED    [facility] [phone]  - [phone] [phone]    Do you require a callback: YES

## 2022-03-11 NOTE — TELEPHONE ENCOUNTER
She can certainly try over-the-counter pain reliever will like Tylenol or ibuprofen, but considering the symptoms reported evaluation is definitely recommended.  Unfortunately I do not have any openings today, I can see her on Monday.  In the meantime she might need to go to the urgent care for evaluation if getting worse or any concerns.  Thank you.

## 2022-04-27 DIAGNOSIS — F98.8 ATTENTION DEFICIT DISORDER (ADD) WITHOUT HYPERACTIVITY: Chronic | ICD-10-CM

## 2022-04-27 RX ORDER — DEXTROAMPHETAMINE SACCHARATE, AMPHETAMINE ASPARTATE MONOHYDRATE, DEXTROAMPHETAMINE SULFATE AND AMPHETAMINE SULFATE 5; 5; 5; 5 MG/1; MG/1; MG/1; MG/1
CAPSULE, EXTENDED RELEASE ORAL
Qty: 30 CAPSULE | Refills: 0 | Status: SHIPPED | OUTPATIENT
Start: 2022-04-27 | End: 2022-05-09

## 2022-05-09 ENCOUNTER — OFFICE VISIT (OUTPATIENT)
Dept: PSYCHIATRY | Facility: CLINIC | Age: 27
End: 2022-05-09

## 2022-05-09 DIAGNOSIS — F41.9 ANXIETY: ICD-10-CM

## 2022-05-09 DIAGNOSIS — F98.8 ATTENTION DEFICIT DISORDER (ADD) WITHOUT HYPERACTIVITY: Primary | Chronic | ICD-10-CM

## 2022-05-09 PROCEDURE — 99214 OFFICE O/P EST MOD 30 MIN: CPT

## 2022-05-09 RX ORDER — DEXTROAMPHETAMINE SACCHARATE, AMPHETAMINE ASPARTATE, DEXTROAMPHETAMINE SULFATE AND AMPHETAMINE SULFATE 5; 5; 5; 5 MG/1; MG/1; MG/1; MG/1
20 TABLET ORAL DAILY
Qty: 30 TABLET | Refills: 0 | Status: SHIPPED | OUTPATIENT
Start: 2022-05-09 | End: 2022-06-09 | Stop reason: SDUPTHER

## 2022-06-09 ENCOUNTER — OFFICE VISIT (OUTPATIENT)
Dept: PSYCHIATRY | Facility: CLINIC | Age: 27
End: 2022-06-09

## 2022-06-09 VITALS
BODY MASS INDEX: 26.72 KG/M2 | SYSTOLIC BLOOD PRESSURE: 119 MMHG | DIASTOLIC BLOOD PRESSURE: 69 MMHG | OXYGEN SATURATION: 99 % | HEART RATE: 63 BPM | WEIGHT: 179.6 LBS

## 2022-06-09 DIAGNOSIS — F41.1 GAD (GENERALIZED ANXIETY DISORDER): Chronic | ICD-10-CM

## 2022-06-09 DIAGNOSIS — F98.8 ATTENTION DEFICIT DISORDER (ADD) WITHOUT HYPERACTIVITY: Primary | Chronic | ICD-10-CM

## 2022-06-09 PROCEDURE — 99214 OFFICE O/P EST MOD 30 MIN: CPT

## 2022-06-09 RX ORDER — DEXTROAMPHETAMINE SACCHARATE, AMPHETAMINE ASPARTATE, DEXTROAMPHETAMINE SULFATE AND AMPHETAMINE SULFATE 5; 5; 5; 5 MG/1; MG/1; MG/1; MG/1
20 TABLET ORAL 2 TIMES DAILY
Qty: 60 TABLET | Refills: 0 | Status: SHIPPED | OUTPATIENT
Start: 2022-06-09 | End: 2022-07-06 | Stop reason: SDUPTHER

## 2022-07-06 DIAGNOSIS — F98.8 ATTENTION DEFICIT DISORDER (ADD) WITHOUT HYPERACTIVITY: Chronic | ICD-10-CM

## 2022-07-06 RX ORDER — DEXTROAMPHETAMINE SACCHARATE, AMPHETAMINE ASPARTATE, DEXTROAMPHETAMINE SULFATE AND AMPHETAMINE SULFATE 5; 5; 5; 5 MG/1; MG/1; MG/1; MG/1
20 TABLET ORAL 2 TIMES DAILY
Qty: 60 TABLET | Refills: 0 | Status: SHIPPED | OUTPATIENT
Start: 2022-07-11 | End: 2022-08-01 | Stop reason: SDUPTHER

## 2022-07-06 NOTE — TELEPHONE ENCOUNTER
Patient to let you know the 20mg adderall is working     Needs refill [facility] to [facility]    Inspect ran  Last  6/13

## 2022-07-07 ENCOUNTER — OFFICE VISIT (OUTPATIENT)
Dept: FAMILY MEDICINE CLINIC | Facility: CLINIC | Age: 27
End: 2022-07-07

## 2022-07-07 VITALS
BODY MASS INDEX: 25.95 KG/M2 | HEART RATE: 44 BPM | DIASTOLIC BLOOD PRESSURE: 69 MMHG | WEIGHT: 175.2 LBS | OXYGEN SATURATION: 98 % | SYSTOLIC BLOOD PRESSURE: 114 MMHG | HEIGHT: 69 IN | RESPIRATION RATE: 16 BRPM | TEMPERATURE: 98.6 F

## 2022-07-07 DIAGNOSIS — Z00.00 PREVENTATIVE HEALTH CARE: Primary | ICD-10-CM

## 2022-07-07 PROCEDURE — 99395 PREV VISIT EST AGE 18-39: CPT | Performed by: FAMILY MEDICINE

## 2022-07-07 NOTE — PROGRESS NOTES
Subjective   Chief Complaint   Patient presents with   • Annual Exam     Neva Whitaker is a 26 y.o. female.     Patient Care Team:  Chika Marie MD as PCP - General (Family Medicine)  Provider, No Known as PCP - Family Medicine  Jessica Holbrook MD as Consulting Physician (Obstetrics and Gynecology)    She is coming in today for her annual wellness exam.  She reports doing well and she denies any new issues or concerns.  She also needs some paperwork and immunization update for school.  She overall has healthy lifestyle.  She works out on regular basis and eats healthy diet.  She gets her annual gynecological checkups done through her gynecologist and reports to be up to speed. Patient does not report any chest pain, shortness of breath, dizziness, nausea, vomiting, or diarrhea, visual issues, headaches, numbness or tingling. No urinary issues reported like urgency, frequency, or discomfort upon urination.  No significant weight changes reported.  No swelling reported.  No rashes or any other skin issues reported. No emotional issues or insomnia.       The following portions of the patient's history were reviewed and updated as appropriate: allergies, current medications, past family history, past medical history, past social history, past surgical history and problem list.  Past Medical History:   Diagnosis Date   • ADHD (attention deficit hyperactivity disorder)    • Anxiety    • Chlamydia 2017     Past Surgical History:   Procedure Laterality Date   • BREAST AUGMENTATION     • TONSILLECTOMY       The patient has a family history of  Family History   Problem Relation Age of Onset   • Heart failure Paternal Grandfather    • Colon cancer Maternal Grandmother         76   • Breast cancer Neg Hx    • Ovarian cancer Neg Hx    • Uterine cancer Neg Hx    • Deep vein thrombosis Neg Hx    • Pulmonary embolism Neg Hx      Social History     Socioeconomic History   • Marital status: Single   Tobacco Use   • Smoking  "status: Never Smoker   • Smokeless tobacco: Never Used   Substance and Sexual Activity   • Alcohol use: Yes     Comment: OCC   • Drug use: No   • Sexual activity: Yes     Partners: Male     Birth control/protection: I.U.D.     Comment: Hortensia 12/2017       Review of Systems   Constitutional: Negative for activity change, appetite change, chills, fatigue, fever, unexpected weight gain and unexpected weight loss.   HENT: Negative for congestion, ear pain, hearing loss, nosebleeds, postnasal drip, swollen glands, tinnitus and trouble swallowing.    Eyes: Negative for blurred vision, double vision and visual disturbance.   Respiratory: Negative for cough, choking, chest tightness, shortness of breath, wheezing and stridor.    Cardiovascular: Negative for chest pain, palpitations and leg swelling.   Gastrointestinal: Negative for abdominal distention, abdominal pain, anal bleeding, constipation, diarrhea, nausea, vomiting and GERD.   Endocrine: Negative for cold intolerance, heat intolerance and polyphagia.   Genitourinary: Negative for dysuria, flank pain, frequency, hematuria and urgency.   Musculoskeletal: Negative for arthralgias, back pain, gait problem, joint swelling and neck pain.   Skin: Negative for color change, dry skin and skin lesions.   Allergic/Immunologic: Negative for environmental allergies and food allergies.   Neurological: Negative for dizziness, tremors, speech difficulty, light-headedness, numbness, headache and confusion.   Hematological: Negative for adenopathy. Does not bruise/bleed easily.   Psychiatric/Behavioral: Negative for decreased concentration, sleep disturbance, depressed mood and stress.     Visit Vitals  /69 (BP Location: Left arm, Patient Position: Sitting, Cuff Size: Adult)   Pulse (!) 44   Temp 98.6 °F (37 °C)   Resp 16   Ht 175.3 cm (69\")   Wt 79.5 kg (175 lb 3.2 oz)   SpO2 98%   BMI 25.87 kg/m²       Current Outpatient Medications:   •  [START ON 7/11/2022] " amphetamine-dextroamphetamine (Adderall) 20 MG tablet, Take 1 tablet by mouth 2 (Two) Times a Day., Disp: 60 tablet, Rfl: 0  •  levonorgestrel (Kyleena) 19.5 MG intrauterine device IUD, 1 each by Intrauterine route 1 (One) Time., Disp: , Rfl:   •  sertraline (ZOLOFT) 50 MG tablet, Take 1 tablet by mouth Daily., Disp: 90 tablet, Rfl: 1    Objective   Physical Exam  Vitals and nursing note reviewed.   Constitutional:       General: She is not in acute distress.     Appearance: She is well-developed. She is not diaphoretic.   HENT:      Head: Normocephalic and atraumatic.      Right Ear: External ear normal.      Left Ear: External ear normal.   Eyes:      General: No scleral icterus.        Right eye: No discharge.         Left eye: No discharge.      Conjunctiva/sclera: Conjunctivae normal.      Pupils: Pupils are equal, round, and reactive to light.   Neck:      Thyroid: No thyromegaly.      Vascular: No JVD.   Cardiovascular:      Rate and Rhythm: Normal rate and regular rhythm.      Heart sounds: Normal heart sounds. No murmur heard.    No friction rub. No gallop.   Pulmonary:      Effort: Pulmonary effort is normal. No respiratory distress.      Breath sounds: Normal breath sounds. No stridor. No wheezing, rhonchi or rales.   Abdominal:      General: Bowel sounds are normal. There is no distension.      Palpations: Abdomen is soft. There is no mass.      Tenderness: There is no abdominal tenderness. There is no guarding or rebound.      Hernia: No hernia is present.   Musculoskeletal:         General: No swelling, tenderness or deformity. Normal range of motion.      Cervical back: Normal range of motion and neck supple. No muscular tenderness.      Right lower leg: No edema.      Left lower leg: No edema.   Lymphadenopathy:      Cervical: No cervical adenopathy.   Skin:     General: Skin is warm and dry.      Capillary Refill: Capillary refill takes less than 2 seconds.      Coloration: Skin is not pale.       Findings: No bruising, erythema, lesion or rash.   Neurological:      General: No focal deficit present.      Mental Status: She is alert and oriented to person, place, and time.      Cranial Nerves: No cranial nerve deficit.      Sensory: No sensory deficit.      Motor: No weakness.      Coordination: Coordination normal.      Deep Tendon Reflexes: Reflexes normal.   Psychiatric:         Mood and Affect: Mood normal.         Behavior: Behavior normal.         Judgment: Judgment normal.         Assessment & Plan   Diagnoses and all orders for this visit:    1. Preventative health care (Primary)      Wellness exam was done today - see above for details. Healthy life style was reviewed and discussed and re-enforced. Regular exercise and healthy diet were also discussed and recommended.  I will be getting a copy of her immunization records from her previous doctor.  She will come back next week for PPD as this is her school requirement.  I will be filling out paperwork for school once all records are available.  She is not vaccinated against COVID-19 and does not plan to proceed with that.        Return in about 1 year (around 7/7/2023) for Annual physical.    Requested Prescriptions      No prescriptions requested or ordered in this encounter

## 2022-07-19 ENCOUNTER — CLINICAL SUPPORT (OUTPATIENT)
Dept: FAMILY MEDICINE CLINIC | Facility: CLINIC | Age: 27
End: 2022-07-19

## 2022-07-21 ENCOUNTER — CLINICAL SUPPORT (OUTPATIENT)
Dept: FAMILY MEDICINE CLINIC | Facility: CLINIC | Age: 27
End: 2022-07-21

## 2022-07-21 DIAGNOSIS — Z23 NEED FOR VACCINATION: Primary | ICD-10-CM

## 2022-07-21 PROCEDURE — 86580 TB INTRADERMAL TEST: CPT | Performed by: FAMILY MEDICINE

## 2022-08-01 DIAGNOSIS — F98.8 ATTENTION DEFICIT DISORDER (ADD) WITHOUT HYPERACTIVITY: Chronic | ICD-10-CM

## 2022-08-01 NOTE — TELEPHONE ENCOUNTER
Patient called requesting a refill on Adderall 20 mg.    Last Fill:07/06/2022     Last Appt: 06/09/2022  Next Appt:  08/12/2022    Inspect Printed

## 2022-08-02 RX ORDER — DEXTROAMPHETAMINE SACCHARATE, AMPHETAMINE ASPARTATE, DEXTROAMPHETAMINE SULFATE AND AMPHETAMINE SULFATE 5; 5; 5; 5 MG/1; MG/1; MG/1; MG/1
20 TABLET ORAL 2 TIMES DAILY
Qty: 60 TABLET | Refills: 0 | Status: SHIPPED | OUTPATIENT
Start: 2022-08-10 | End: 2022-09-08 | Stop reason: SDUPTHER

## 2022-08-04 ENCOUNTER — CLINICAL SUPPORT (OUTPATIENT)
Dept: FAMILY MEDICINE CLINIC | Facility: CLINIC | Age: 27
End: 2022-08-04

## 2022-08-04 DIAGNOSIS — Z23 NEED FOR TDAP VACCINATION: Primary | ICD-10-CM

## 2022-08-04 PROCEDURE — 90715 TDAP VACCINE 7 YRS/> IM: CPT | Performed by: FAMILY MEDICINE

## 2022-08-04 PROCEDURE — 90471 IMMUNIZATION ADMIN: CPT | Performed by: FAMILY MEDICINE

## 2022-08-16 ENCOUNTER — TELEPHONE (OUTPATIENT)
Dept: PSYCHIATRY | Facility: CLINIC | Age: 27
End: 2022-08-16

## 2022-08-17 ENCOUNTER — OFFICE VISIT (OUTPATIENT)
Dept: OBSTETRICS AND GYNECOLOGY | Age: 27
End: 2022-08-17

## 2022-08-17 VITALS
WEIGHT: 160 LBS | DIASTOLIC BLOOD PRESSURE: 66 MMHG | BODY MASS INDEX: 23.7 KG/M2 | SYSTOLIC BLOOD PRESSURE: 110 MMHG | HEIGHT: 69 IN

## 2022-08-17 DIAGNOSIS — Z30.431 IUD CHECK UP: ICD-10-CM

## 2022-08-17 DIAGNOSIS — Z01.419 ENCOUNTER FOR GYNECOLOGICAL EXAMINATION WITHOUT ABNORMAL FINDING: Primary | ICD-10-CM

## 2022-08-17 PROCEDURE — 99395 PREV VISIT EST AGE 18-39: CPT | Performed by: OBSTETRICS & GYNECOLOGY

## 2022-08-17 NOTE — PROGRESS NOTES
Routine Annual Visit    2022    Patient: Neva Whitaker          MR#:[id number]      Chief Complaint   Patient presents with   • Gynecologic Exam     AE Today, Last AE 2021, Last pap 2020 (-), Former Dr. Holbrook pt.        History of Present Illness    26 y.o. female  who presents for annual exam. She is doing well, has a bodybuilding competition this weekend in [place]. She has the kyleena, very happy with it. Due to be replaced in December and does want another.    Health Maintenance  Gardasil received  Last pap: , history abnormal: none  Family history of Breast, ovarian, uterine, colon, pancreatic cancer: no      No LMP recorded. Patient has had an implant.  Obstetric History:  OB History        0    Para   0    Term   0       0    AB   0    Living   0       SAB   0    IAB   0    Ectopic   0    Molar   0    Multiple   0    Live Births   0               Menstrual History:     No LMP recorded. Patient has had an implant.       ________________________________________  Patient Active Problem List   Diagnosis   • Chronic bilateral low back pain without sciatica   • Preventative health care   • COLTON (generalized anxiety disorder)   • Attention deficit disorder (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 hyperactivity       Past Medical History:   Diagnosis Date   • ADHD (attention deficit hyperactivity disorder)    • Anxiety    • Chlamydia 2017       Family History   Problem Relation Age of Onset   • Heart failure Paternal Grandfather    • Colon cancer Maternal Grandmother         76   • Breast cancer Neg Hx    • Ovarian cancer Neg Hx    • Uterine cancer Neg Hx    • Deep vein thrombosis Neg Hx    • Pulmonary embolism Neg Hx        Past Surgical History:   Procedure Laterality Date   • BREAST AUGMENTATION     • TONSILLECTOMY         Social History     Tobacco Use   Smoking Status Never Smoker   Smokeless Tobacco Never Used       has a current medication list which includes the following prescription(s):  "amphetamine-dextroamphetamine, kyleena, and sertraline.  ________________________________________      Review of Systems   Constitutional: Negative for fever and unexpected weight change.   Respiratory: Negative for shortness of breath.    Cardiovascular: Negative for chest pain.   Gastrointestinal: Negative for abdominal pain, constipation and diarrhea.   Genitourinary: Negative for frequency and urgency.   Hematological: Negative for adenopathy.   Psychiatric/Behavioral: Negative for dysphoric mood.       Objective   Physical Exam    /66   Ht 175.3 cm (69\")   Wt 72.6 kg (160 lb)   Breastfeeding No   BMI 23.63 kg/m²    BP Readings from Last 3 Encounters:   08/17/22 110/66   07/07/22 114/69   06/09/22 119/69      Wt Readings from Last 3 Encounters:   08/17/22 72.6 kg (160 lb)   07/07/22 79.5 kg (175 lb 3.2 oz)   06/09/22 81.5 kg (179 lb 9.6 oz)         BMI: Body mass index is 23.63 kg/m².       General:   alert, appears stated age and cooperative   Neck: No thyromegaly or LAD   Abdomen: soft, non-tender, without masses or organomegaly   Breast: inspection negative, no nipple discharge or bleeding, no masses or nodularity palpable and implants in place   Urethra and bladder: urethral meatus normal; bladder nontender to palpation;   Vulva: normal, Bartholin's, Urethra, Lindy's normal   Vagina: normal mucosa, normal discharge   Cervix: no lesions, nulliparous appearance and iud strings seen   Uterus: normal size and anteverted   Adnexa: normal adnexa and no mass, fullness, tenderness       Assessment:    normal annual exam   iud in place    Plan:    Plan     []  Mammogram request made  []  PAP done  []  Labs:   []  GC/Chl/TV  []  DEXA scan   []  Referral for colonoscopy:     STI check, return in December for kyleena removal and replacement. Desires ultrasound after placement for confirmation    Counseling  [x]  Nutrition  [x]  Physical activity/regular exercise   [x]  Healthy weight  []  Injury prevention  [] "  Smoking cessation  []  Substance misuse/abuse  [x]  Sexual behavior  []  STD prevention  []  Contraception  []  Dental health  []  Mental health  []  Immunization  [x]  Encouraged SBE        Debby Ocasio MD  08/17/2022  08:26 EDT

## 2022-09-08 ENCOUNTER — OFFICE VISIT (OUTPATIENT)
Dept: PSYCHIATRY | Facility: CLINIC | Age: 27
End: 2022-09-08

## 2022-09-08 VITALS
DIASTOLIC BLOOD PRESSURE: 64 MMHG | BODY MASS INDEX: 26.91 KG/M2 | WEIGHT: 182.2 LBS | OXYGEN SATURATION: 99 % | HEART RATE: 60 BPM | SYSTOLIC BLOOD PRESSURE: 122 MMHG

## 2022-09-08 DIAGNOSIS — F41.1 GAD (GENERALIZED ANXIETY DISORDER): Primary | Chronic | ICD-10-CM

## 2022-09-08 DIAGNOSIS — F98.8 ATTENTION DEFICIT DISORDER (ADD) WITHOUT HYPERACTIVITY: Chronic | ICD-10-CM

## 2022-09-08 PROCEDURE — 99214 OFFICE O/P EST MOD 30 MIN: CPT

## 2022-09-08 RX ORDER — DEXTROAMPHETAMINE SACCHARATE, AMPHETAMINE ASPARTATE, DEXTROAMPHETAMINE SULFATE AND AMPHETAMINE SULFATE 5; 5; 5; 5 MG/1; MG/1; MG/1; MG/1
20 TABLET ORAL 2 TIMES DAILY
Qty: 60 TABLET | Refills: 0 | Status: SHIPPED | OUTPATIENT
Start: 2022-09-08 | End: 2022-10-11 | Stop reason: SDUPTHER

## 2022-09-08 RX ORDER — SERTRALINE HYDROCHLORIDE 100 MG/1
100 TABLET, FILM COATED ORAL DAILY
Qty: 90 TABLET | Refills: 1 | Status: SHIPPED | OUTPATIENT
Start: 2022-09-08 | End: 2023-01-11 | Stop reason: SDUPTHER

## 2022-09-08 NOTE — PROGRESS NOTES
Subjective   Neva Whitaker is a 26 y.o. female who presents today for f/u med management.    Chief Complaint: Some anxiety    History of Present Illness:  -Pt says she is going back to school next week and would like to be evaluated for difficulty with concentration and focus.  -Denies Prior ADHD dx, but concentration and focus have always been somewhat difficult for her.  -Hx anxiety treated with Sertraline with incomplete benefit, but anxiety believed to be from difficulty maintaining focus and concentration.  SI/HI: Denies  AVH: Denies  Possible Manic Sx on MDQ: 2    2/22/22: Pt reports she has seen a difference in concentration and focus, but she also feels like her mind won't shut off because she is constantly worrying. Says she overall doesn't like how she feels on the medicine.  -At first she appetite was ok, , but admits she often feels like she needs to force herself to eat.  -Wouldn't mind trying an alternative for ADD.  -Denies AVH, depression concern, HI, or SI.    5/9/22:Says she took a couple of her boyfriends medication and found it very helpful for her concentration and focus and didn't have poor appetite, or concerning side effects. It was 20mg Adderall tabs, and she would prefer to take those. She tried the Strattera for a month and couldn't get it refilled because her pharmacy said a PA was required. She says she is on her stepdad's insurance which is through [facility] and she has not used [facility] pharmacy. She didn't notice any benefit however with a month of using 40mg of Strattera daily. C/o more worry because she is trying to get into a PT program and has anxiety surrounding her school work currently. She has tried hydroxyzine before and it seemed to calm her down but also make her sleepy, and she still has some of these at home.  -When she tried her boyfriends 20mg Adderall IR she said it lasted for 5-6 hours w/o side effects. She reports being able to get more things done, and noticing it takes  about 30m to start acting and feels like it lasts enough throughout the day.  Only taken bp medication a couple times, but noticed  -She is willing to try to cut the hydroxyzine she has in half to see if there is some anxiety benefit she could obtain without sedation.   -Denies AVH, SI/HI, or depression concerns.    6/9/22: Lasts from 7am to 1pm with one pill, and would prefer for it to last longer.  She is agreeable to twice daily dosing, and is agreeable to try half tab before whole tab in afternoon.  Denies anxiety or depression concerns with sertraline, AVH, or SI/HI.    9/8/2022: Patient feels that Adderall is continuing to work wonderfully.  Does not have concerns with how long it lasts or how effective it is at the current dosing strategy. 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  States she has not been sleeping as well and will have difficulty sleeping sometimes at night associated with her mind racing and anxious thoughts.  She does not believe it is the Adderall as it was not causing that before, but difficulty with school.  She has obtained extended testing time for ADHD per my recommendations which has been helpful for her in her academic endeavors.  Denies AVH or SI/HI.    The following portions of the patient's history were reviewed and updated as appropriate: allergies, current medications, past family history, past medical history, past social history, past surgical history and problem list.    PAST OUTPATIENT TREATMENT  Past Psychiatric History:  Diagnoses: Anxiety, ADD  Previous Psychiatrist: Denies; managed by PCP.  Therapist: Denied.  Medication Trials:  Zoloft - incomplete efficacy at 50mg.  20mg Adderall XR - poor appetite, worrying.  Hydroxyzine - makes sleepy.  Strattera - didn't like how it made her feel.  Sequelae Of Mental Disorder:  job disruption, emotional distress    Interval  History  Worsened anxiety.  Stable and remaining improved ADD control.    Side Effects  Denied    Problem List:  Patient Active Problem List   Diagnosis   • Chronic bilateral low back pain without sciatica   • Preventative health care   • COLTON (generalized anxiety disorder)   • Attention deficit disorder (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 hyperactivity     Allergy:   No Known Allergies    Discontinued Medications:  Medications Discontinued During This Encounter   Medication Reason   • cephalexin (KEFLEX) 500 MG capsule Other- See Medication Note   • levonorgestrel (Kyleena) 19.5 MG intrauterine device IUD *Therapy completed   • sertraline (ZOLOFT) 50 MG tablet Dose adjustment   • amphetamine-dextroamphetamine (Adderall) 20 MG tablet Reorder     Vital Signs:   /64   Pulse 60   Wt 82.6 kg (182 lb 3.2 oz)   SpO2 99%   BMI 26.91 kg/m²    Wt Readings from Last 5 Encounters:   09/08/22 82.6 kg (182 lb 3.2 oz)   08/17/22 72.6 kg (160 lb)   07/07/22 79.5 kg (175 lb 3.2 oz)   06/09/22 81.5 kg (179 lb 9.6 oz)   02/22/22 86.4 kg (190 lb 6.4 oz)     BP Readings from Last 5 Encounters:   09/08/22 122/64   08/17/22 110/66   07/07/22 114/69   06/09/22 119/69   01/14/22 116/72     Pulse Readings from Last 5 Encounters:   09/08/22 60   07/07/22 (!) 44   06/09/22 63   07/15/21 53   11/10/20 58     SpO2 Readings from Last 5 Encounters:   09/08/22 99%   07/07/22 98%   06/09/22 99%   07/15/21 98%   11/10/20 96%     Mental Status Exam:   Orientation:  To person, Place, Time and Situation  Memory: Recent and remote memory Intact  Mood/Affect: Slightly anxious and restricted  Hopelessness: Denies  Suicidal Ideations: None  Homicidal Ideations:  None  Hallucinations: None  Delusions:  None  Obsessions: None  Behavior and Psychomotor Activity: Normal  Speech:  Normal  Thought Process: Goal directed and linear  Thought Content: Normal  Associations: Intact  Language: name objects and repeat phrases  Concentration and computation: Good  Attention Span:  Good  Fund of Knowledge: Fair  Reliability: good  Insight into mental health: Fair  Judgement: Good  Impulse Control:  Good  Hygiene: good  Cooperation:  Cooperative  Eye Contact:  Good  Physical/Medical Issues:  Yes lower back sciatica.    MSE reviewed and accepted with changes from the previous visit.    PHQ-9 Depression Screening  Little interest or pleasure in doing things? 1-->several days   Feeling down, depressed, or hopeless? 1-->several days   Trouble falling or staying asleep, or sleeping too much? 2-->more than half the days   Feeling tired or having little energy? 1-->several days   Poor appetite or overeating? 0-->not at all   Feeling bad about yourself - or that you are a failure or have let yourself or your family down? 0-->not at all   Trouble concentrating on things, such as reading the newspaper or watching television? 1-->several days   Moving or speaking so slowly that other people could have noticed? Or the opposite - being so fidgety or restless that you have been moving around a lot more than usual? 0-->not at all   Thoughts that you would be better off dead, or of hurting yourself in some way? 0-->not at all   PHQ-9 Total Score 6   If you checked off any problems, how difficult have these problems made it for you to do your work, take care of things at home, or get along with other people? somewhat difficult     Feeling nervous, anxious or on edge: Not at all  Not being able to stop or control worrying: Not at all  Worrying too much about different things: Several days  Trouble Relaxing: Several days  Being so restless that it is hard to sit still: Not at all  Feeling afraid as if something awful might happen: Several days  Becoming easily annoyed or irritable: Not at all  COLTON 7 Total Score: 3  If you checked any problems, how difficult have these problems made it for you to do your work, take care of things at home, or get along with other people: Somewhat difficult  PHQ-2: 6; mild  depression. Previously: 0, 6, 1.  COLTON-7: 3; unlikely anxiety. Previously: 2, 8.  MDQ: Negative; unlikely bipolar d/o w/ 2 sxs on the MDQ. (22)  ASRS: 32 (Inattentive); 13 (Hyperactive). Highly likely ADD. (22)    Never smoker  I advised Neva of the risks of tobacco use.     Lab Results: None.               Assessment & Plan   Diagnoses and all orders for this visit:    1. COLTON (generalized anxiety disorder) (Primary)  -     sertraline (ZOLOFT) 100 MG tablet; Take 1 tablet by mouth Daily.  Dispense: 90 tablet; Refill: 1    2. Attention deficit disorder (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 hyperactivity  -     amphetamine-dextroamphetamine (Adderall) 20 MG tablet; Take 1 tablet by mouth 2 (Two) Times a Day.  Dispense: 60 tablet; Refill: 0    PHQ-2: 6; mild depression. Previously: 0, 6, 1.  COLTON-7: 3; unlikely anxiety. Previously: 2, 8.  MDQ: Negative; unlikely bipolar d/o w/ 2 sxs on the MDQ. (22)  ASRS: 32 (Inattentive); 13 (Hyperactive). Highly likely ADD. (22)  -Continue 20 mg Adderall IR twice daily for ADD.  -Increase to 100 mg sertraline daily for COLTON.   -F/U in 3 months, determine continued stability on 20 mg Adderall IR and the anxiety benefit of increasing sertraline to 100 mg. Determine any hx of Self Harm, SA, Psychiatric Hospitalizations, and  Psychosis, Anxiety, or Depression. Monitor wt/bp/hr.    Visit Diagnoses:    ICD-10-CM ICD-9-CM   1. COLTON (generalized anxiety disorder)  F41.1 300.02   2. Attention deficit disorder (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 hyperactivity  F98.8 314.00     TREATMENT PLAN/GOALS: In the short-term, the patient is to continue supportive psychotherapy efforts and medications as indicated. Treatment and medication options were discussed during today's visit. Long-term the goal will be to control symptoms so they do not negatively interfere with other aspects of the patient's life. Prognosis is optimistic with implementation of the current treatment plan. Patient ackowledged and verbally  consented to the treatment plan and was educated on the importance of compliance with treatment and follow-up appointments.    MEDICATION ISSUES:  INSPECT reviewed 9/8/2022, and is as expected.  Taking 20 mg Adderall IR.  Discussed medication options and treatment plan of prescribed medication as well as the risks, benefits, and side effects including potential falls, possible impaired driving, and metabolic adversities among others. Patient counseled on a multimodal approach with healthy nutrition, healthy sleep, regular physical activity, social activity, counseling, and medication taking part in his/her psychiatric management. Patient is agreeable to the plan, and he/she agreed to call the office with any worsening of symptoms or onset of side effects. Patient is agreeable to call 911 or go to the nearest ER should he/she begin having SI/HI with plans or anticipated actions or self-harming behavior.  Assisted patient in processing above session content; acknowledged and normalized patient’s thoughts, feelings, and concerns. Reviewed positive coping skills and behavior management in session with positive framing of thoughts. Allowed patient to freely discuss issues without interruption or judgment. Provided safe, confidential environment to facilitate the development of positive therapeutic relationship and encourage open and honest communication.    MEDS ORDERED DURING VISIT:  New Medications Ordered This Visit   Medications   • sertraline (ZOLOFT) 100 MG tablet     Sig: Take 1 tablet by mouth Daily.     Dispense:  90 tablet     Refill:  1   • amphetamine-dextroamphetamine (Adderall) 20 MG tablet     Sig: Take 1 tablet by mouth 2 (Two) Times a Day.     Dispense:  60 tablet     Refill:  0     Return in about 3 months (around 12/8/2022) for Recheck.     This document has been electronically signed by Negrito Armstrong PA-C  September 8, 2022 14:28 EDT    EMR Dragon transcription disclaimer:  Part of this note  may be an electronic transcription/translation of spoken language to printed text using the Dragon Dictation System.

## 2022-09-14 ENCOUNTER — TELEPHONE (OUTPATIENT)
Dept: FAMILY MEDICINE CLINIC | Facility: CLINIC | Age: 27
End: 2022-09-14

## 2022-09-14 NOTE — TELEPHONE ENCOUNTER
The order for the TB test is in her chart from 7/2022.  I believe she was supposed to come at that time to get it done.  If she has not she can certainly schedule it with us.  Please assist the patient with that.  Thank you.

## 2022-09-14 NOTE — TELEPHONE ENCOUNTER
HUB UNABLE TO WARM TRANSFER.    Caller: Neva Whitaker    Relationship to patient: Self    Best call back number: [phone]    Chief complaint: TB SKIN TEST    Type of visit: OFFICE    Requested date: ASAP         Additional notes: PATIENT CALLING REQUESTING FOR TB SKIN, FOR SCHOOL, PLEASE ADVISE, CONTACT PATIENT TO MAKE APPOINTMENT FOR VISIT. HUB UNABLE TO MAKE NURSE/MA VISIT APPOINTMENTS.

## 2022-09-15 NOTE — PROGRESS NOTES
I have reviewed the notes, assessments, and/or procedures performed by Negrito Armstrong, I concur with her/his documentation of Neva Whitaker.

## 2022-09-21 ENCOUNTER — CLINICAL SUPPORT (OUTPATIENT)
Dept: FAMILY MEDICINE CLINIC | Facility: CLINIC | Age: 27
End: 2022-09-21

## 2022-09-21 DIAGNOSIS — Z23 NEED FOR VACCINATION: Primary | ICD-10-CM

## 2022-09-21 PROCEDURE — 86580 TB INTRADERMAL TEST: CPT | Performed by: FAMILY MEDICINE

## 2022-09-23 ENCOUNTER — CLINICAL SUPPORT (OUTPATIENT)
Dept: FAMILY MEDICINE CLINIC | Facility: CLINIC | Age: 27
End: 2022-09-23

## 2022-09-23 DIAGNOSIS — Z23 NEED FOR VACCINATION: Primary | ICD-10-CM

## 2022-09-23 PROCEDURE — 86580 TB INTRADERMAL TEST: CPT | Performed by: FAMILY MEDICINE

## 2022-10-11 DIAGNOSIS — F98.8 ATTENTION DEFICIT DISORDER (ADD) WITHOUT HYPERACTIVITY: Chronic | ICD-10-CM

## 2022-10-11 RX ORDER — DEXTROAMPHETAMINE SACCHARATE, AMPHETAMINE ASPARTATE, DEXTROAMPHETAMINE SULFATE AND AMPHETAMINE SULFATE 5; 5; 5; 5 MG/1; MG/1; MG/1; MG/1
20 TABLET ORAL 2 TIMES DAILY
Qty: 60 TABLET | Refills: 0 | Status: SHIPPED | OUTPATIENT
Start: 2022-10-11 | End: 2022-11-23 | Stop reason: SDUPTHER

## 2022-10-12 ENCOUNTER — CLINICAL SUPPORT (OUTPATIENT)
Dept: PSYCHIATRY | Facility: CLINIC | Age: 27
End: 2022-10-12

## 2022-10-12 DIAGNOSIS — F98.8 ATTENTION DEFICIT DISORDER (ADD) WITHOUT HYPERACTIVITY: Primary | Chronic | ICD-10-CM

## 2022-11-17 ENCOUNTER — CLINICAL SUPPORT (OUTPATIENT)
Dept: FAMILY MEDICINE CLINIC | Facility: CLINIC | Age: 27
End: 2022-11-17

## 2022-11-17 DIAGNOSIS — Z23 NEED FOR VACCINATION: Primary | ICD-10-CM

## 2022-11-17 PROCEDURE — 90471 IMMUNIZATION ADMIN: CPT | Performed by: FAMILY MEDICINE

## 2022-11-17 PROCEDURE — 90686 IIV4 VACC NO PRSV 0.5 ML IM: CPT | Performed by: FAMILY MEDICINE

## 2022-11-23 DIAGNOSIS — F98.8 ATTENTION DEFICIT DISORDER (ADD) WITHOUT HYPERACTIVITY: Chronic | ICD-10-CM

## 2022-11-23 RX ORDER — DEXTROAMPHETAMINE SACCHARATE, AMPHETAMINE ASPARTATE, DEXTROAMPHETAMINE SULFATE AND AMPHETAMINE SULFATE 5; 5; 5; 5 MG/1; MG/1; MG/1; MG/1
20 TABLET ORAL 2 TIMES DAILY
Qty: 60 TABLET | Refills: 0 | Status: SHIPPED | OUTPATIENT
Start: 2022-11-23 | End: 2023-01-11

## 2022-11-23 NOTE — TELEPHONE ENCOUNTER
Rx Refill Note  Requested Prescriptions     Pending Prescriptions Disp Refills   • amphetamine-dextroamphetamine (Adderall) 20 MG tablet 60 tablet 0     Sig: Take 1 tablet by mouth 2 (Two) Times a Day.      Last office visit with prescribing clinician: Visit date not found      Next office visit with prescribing clinician: 12/08/22 jackie (brandy)    Office Visit with Brandy Armstrong PA-C (09/08/2022)    SCANNED - LABS (10/12/2022)    Inspect fill 10/14/22    Barbara Zee  11/23/22, 15:16 EST

## 2023-01-11 DIAGNOSIS — F98.8 ATTENTION DEFICIT DISORDER (ADD) WITHOUT HYPERACTIVITY: Chronic | ICD-10-CM

## 2023-01-11 DIAGNOSIS — F41.1 GAD (GENERALIZED ANXIETY DISORDER): Chronic | ICD-10-CM

## 2023-01-11 RX ORDER — DEXTROAMPHETAMINE SACCHARATE, AMPHETAMINE ASPARTATE, DEXTROAMPHETAMINE SULFATE AND AMPHETAMINE SULFATE 5; 5; 5; 5 MG/1; MG/1; MG/1; MG/1
TABLET ORAL
Qty: 60 TABLET | Refills: 0 | Status: SHIPPED | OUTPATIENT
Start: 2023-01-11 | End: 2023-01-25 | Stop reason: SDUPTHER

## 2023-01-11 RX ORDER — SERTRALINE HYDROCHLORIDE 100 MG/1
100 TABLET, FILM COATED ORAL DAILY
Qty: 90 TABLET | Refills: 1 | Status: SHIPPED | OUTPATIENT
Start: 2023-01-11 | End: 2023-02-14

## 2023-01-11 NOTE — TELEPHONE ENCOUNTER
Rx Refill Note  Requested Prescriptions     Pending Prescriptions Disp Refills   • amphetamine-dextroamphetamine (ADDERALL) 20 MG tablet [Pharmacy Med Name: AMPHETAMINE/ DEXTRO 20MG] 60 tablet 0     Sig: TAKE 1 TABLET BY MOUTH 2 TIMES A DAY.   • sertraline (ZOLOFT) 100 MG tablet 90 tablet 1     Sig: Take 1 tablet by mouth Daily.      Last office visit with prescribing clinician: Visit date not found   Last telemedicine visit with prescribing clinician: Visit date not found   Next office visit with prescribing clinician: Visit date not found   {  SCANNED - LABS (10/12/2022)    Progress Notes by Negrito Armstrong PA-C (09/08/2022 09:45)    Inspect 11-      Kacey Carlin MA  01/11/23, 13:49 EST

## 2023-01-17 ENCOUNTER — OFFICE VISIT (OUTPATIENT)
Dept: OBSTETRICS AND GYNECOLOGY | Age: 28
End: 2023-01-17
Payer: COMMERCIAL

## 2023-01-17 VITALS
BODY MASS INDEX: 27.99 KG/M2 | SYSTOLIC BLOOD PRESSURE: 120 MMHG | WEIGHT: 189 LBS | HEIGHT: 69 IN | DIASTOLIC BLOOD PRESSURE: 82 MMHG

## 2023-01-17 DIAGNOSIS — Z30.430 ENCOUNTER FOR IUD INSERTION: ICD-10-CM

## 2023-01-17 DIAGNOSIS — Z30.432 ENCOUNTER FOR IUD REMOVAL: Primary | ICD-10-CM

## 2023-01-17 LAB
B-HCG UR QL: NEGATIVE
EXPIRATION DATE: NORMAL
INTERNAL NEGATIVE CONTROL: NORMAL
INTERNAL POSITIVE CONTROL: NORMAL
Lab: NORMAL

## 2023-01-17 PROCEDURE — 58301 REMOVE INTRAUTERINE DEVICE: CPT | Performed by: PHYSICIAN ASSISTANT

## 2023-01-17 PROCEDURE — 81025 URINE PREGNANCY TEST: CPT | Performed by: PHYSICIAN ASSISTANT

## 2023-01-17 PROCEDURE — 58300 INSERT INTRAUTERINE DEVICE: CPT | Performed by: PHYSICIAN ASSISTANT

## 2023-01-17 NOTE — PROGRESS NOTES
IUD Removal    Date of procedure:  1/17/2023    Risks and benefits discussed? yes  All questions answered? yes  Consents given by The patient  Reason for removal: Device expiration        Procedure documentation:    A speculum was placed in order to view the cervix.  .  The IUD string was easily seen.  The string was grasped and the IUD was removed without difficulty.  The IUD did not appear to be adherent to the uterine cavity. It was removed intact.    She tolerated the procedure without any difficulty.     Post procedure instructions: Patient notified to call with heavy bleeding, fever or increasing pain.    Follow up needed: 6 week(s) for iud check      IUD Insertion    No LMP recorded. Patient has had an implant.    Date of procedure:  1/17/2023    Risks and benefits discussed? yes  All questions answered? yes  Consents given by The patient  Written consent obtained? yes    Procedure documentation:     Urine pregnancy test was done and was NEGATIVE .  The risks (including infection,  bleeding, pain, and uterine perforation) and benefits of the procedure were  explained to the patient and Written informed consent was  obtained.    After verifying the patient had a low probability of being pregnant and met the criteria for insertion, a sterile speculum has placed and the cervix was cleansed with an antiseptic solution.  Vaginal discharge was scant.  The anterior lip of the cervix was grasped with an allis and the uterine cavity was gently sounded. There was no difficulty passing the sound through the cervix.  Cervical dilation did not need to be performed prior to placing the IUD.  The uterus was anteverted and sounded to 9 cms.  The Kyleena was then prepared per the manufacturers instructions.    The Kyleena was advanced to a point 2 cms from the fundus and then the arms were released from the sheath.  The device was advanced to the fundus and the device was released fully from the sheath.. The string was cut 2  cms in length.  Bleeding from the cervix was scant.    She tolerated the procedure without any difficulty.    Post procedure instructions: The patient was advised to call for any fever or for  prolonged or severe pain or bleeding. Pelvic rest  advised for 2 wks    Follow up needed: 6 weeks for IUD check    U/s done and IUD in place

## 2023-01-25 DIAGNOSIS — F98.8 ATTENTION DEFICIT DISORDER (ADD) WITHOUT HYPERACTIVITY: Chronic | ICD-10-CM

## 2023-01-25 RX ORDER — DEXTROAMPHETAMINE SACCHARATE, AMPHETAMINE ASPARTATE, DEXTROAMPHETAMINE SULFATE AND AMPHETAMINE SULFATE 5; 5; 5; 5 MG/1; MG/1; MG/1; MG/1
1 TABLET ORAL 2 TIMES DAILY
Qty: 60 TABLET | Refills: 0 | Status: SHIPPED | OUTPATIENT
Start: 2023-01-25 | End: 2023-03-08 | Stop reason: SDUPTHER

## 2023-01-25 NOTE — TELEPHONE ENCOUNTER
Rx Refill Note  Requested Prescriptions     Pending Prescriptions Disp Refills   • amphetamine-dextroamphetamine (ADDERALL) 20 MG tablet 60 tablet 0     Sig: Take 1 tablet by mouth 2 (Two) Times a Day.      Last office visit with prescribing clinician: Visit date not found     Next office visit with prescribing clinician: name given to front for appt    SCANNED - LABS (10/12/2022)    Office Visit with Negrito Armstrong PA-C (09/08/2022)    Inspect fill 11/23/22    Barbara Zee  01/25/23, 10:34 EST

## 2023-01-26 NOTE — TELEPHONE ENCOUNTER
Left detailed message about calling and making an appt with Provider Cotton, and refill being sent in

## 2023-02-14 ENCOUNTER — OFFICE VISIT (OUTPATIENT)
Dept: PSYCHIATRY | Facility: CLINIC | Age: 28
End: 2023-02-14
Payer: COMMERCIAL

## 2023-02-14 VITALS
WEIGHT: 191.6 LBS | DIASTOLIC BLOOD PRESSURE: 78 MMHG | OXYGEN SATURATION: 99 % | SYSTOLIC BLOOD PRESSURE: 140 MMHG | HEART RATE: 55 BPM | BODY MASS INDEX: 28.29 KG/M2

## 2023-02-14 DIAGNOSIS — F41.1 GENERALIZED ANXIETY DISORDER: Primary | Chronic | ICD-10-CM

## 2023-02-14 DIAGNOSIS — F90.2 ATTENTION DEFICIT HYPERACTIVITY DISORDER, COMBINED TYPE: Chronic | ICD-10-CM

## 2023-02-14 PROCEDURE — 99214 OFFICE O/P EST MOD 30 MIN: CPT

## 2023-02-14 RX ORDER — PROPRANOLOL HYDROCHLORIDE 10 MG/1
10 TABLET ORAL 2 TIMES DAILY PRN
Qty: 60 TABLET | Refills: 1 | Status: SHIPPED | OUTPATIENT
Start: 2023-02-14

## 2023-02-14 NOTE — PROGRESS NOTES
Subjective   Neva Whitaker is a 27 y.o. female who presents today for follow-up for psychiatric medication management.     Chief Complaint:  Patient is here to follow up on anxiety and ADHD. She is also here to establish care with this provider.     History of Present Illness:     Patient last saw JULIAN Armstrong on 9/8/22. At that time, she was taking 20mg Adderall IR twice daily, and 100mg sertraline daily.     At today's visit, the patient states there has been a lot of circumstantial things going on in her life that have increased her stress and anxiety.   Someone just passed away in her [place].   Grandma had a stroke.   She said she had a panic attack a few weeks ago. Says she couldn't calm down. She says there was other factors going on. She was drinking the weekend before. She also took a CBD gummy, a quarter of it. She feels like this increased her anxiety.   She also got her wisdom teeth out.   She is taking Adderall 20mg IR twice daily. She has only been taking as needed. She feels like her anxiety is less when she takes it. We discussed taking it every day, if it helps her anxiety.   Taking Sertraline 100mg daily.   She has a fear of brain cancer. No one in her family had it. It is just a fear she has.   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 She feels like she put too much pressure on herself. She is doing better this semester.   Only taking Adderall IR 20mg when she absolutely needs it. But she thinks she feels better when she takes it.   She is only taking 50mg sertraline. She feels like she didn't have any libido on 100mg. She feels like the 50mg is working ok.    We discussed trying propranolol as an as needed medication for anxiety. She was agreeable to this.     Past medical history: anxiety, ADHD  Past psychiatric history: anxiety, ADHD  Family history: None pertinent.   Social history: Patient is not a smoker, she reports occasional alcohol use, and no drug use.      Ability and capacity to respond to treatment: Good  Functional status: Good  Prognosis: Good  Long term goals: Decrease anxiety and improve overall quality of life.   Short term goals: Improve focus and decrease anxiety.   Strengths: Patient is compliant with medications.   Weaknesses: Patient has a lot of personal stressors in her life.     Patient presents with symptoms and behaviors that are consistent with the following DSM-5 diagnoses:  1. Generalized anxiety disorder  2. ADHD    The following portions of the patient's history were reviewed and updated as appropriate: allergies, current medications, past family history, past medical history, past social history, past surgical history and problem list.    PAST OUTPATIENT TREATMENT  Diagnosis treated:  Anxiety/Panic Disorder, ADD  Treatment Type:  Medication Management  Prior Psychiatric Medications:  Zoloft - incomplete efficacy at 50mg.  20mg Adderall XR - poor appetite, worrying.  Hydroxyzine - makes sleepy.  Strattera - didn't like how it made her feel.  Support Groups:  None  Sequelae Of Mental Disorder:  emotional distress    Interval History  Improved    Side Effects  None      Past Medical History:  Past Medical History:   Diagnosis Date   • ADHD (attention deficit hyperactivity disorder)    • Anxiety    • Chlamydia 2017       Social History:  Social History     Socioeconomic History   • Marital status: Single   Tobacco Use   • Smoking status: Never   • Smokeless tobacco: Never   Vaping Use   • Vaping Use: Never used   Substance and Sexual Activity   • Alcohol use: Not Currently     Comment: OCC   • Drug use: No   • Sexual activity: Yes     Partners: Male     Birth control/protection: I.U.D.     Comment: Hortensia 12/2017       Family History:  Family History   Problem Relation Age of Onset   • Heart failure Paternal Grandfather    • Colon cancer Maternal Grandmother         76   • Breast cancer Neg Hx    • Ovarian cancer Neg Hx    • Uterine cancer Neg  Hx    • Deep vein thrombosis Neg Hx    • Pulmonary embolism Neg Hx        Past Surgical History:  Past Surgical History:   Procedure Laterality Date   • BREAST AUGMENTATION     • TONSILLECTOMY         Problem List:  Patient Active Problem List   Diagnosis   • Chronic bilateral low back pain without sciatica   • Preventative health care   • COLTON (generalized anxiety disorder)   • Attention deficit disorder (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 hyperactivity       Allergy:   No Known Allergies     Discontinued Medications:  Medications Discontinued During This Encounter   Medication Reason   • sertraline (ZOLOFT) 100 MG tablet *Therapy completed       Current Medications:   Current Outpatient Medications   Medication Sig Dispense Refill   • amphetamine-dextroamphetamine (ADDERALL) 20 MG tablet Take 1 tablet by mouth 2 (Two) Times a Day. 60 tablet 0   • levonorgestrel (KYLEENA) 19.5 MG intrauterine device IUD To be inserted one time by prescriber by Intrauterine route. 1 each 0   • propranolol (INDERAL) 10 MG tablet Take 1 tablet by mouth 2 (Two) Times a Day As Needed (anxiety). 60 tablet 1   • sertraline (Zoloft) 50 MG tablet Take 1 tablet by mouth Daily. 30 tablet 2     No current facility-administered medications for this visit.         Psychological ROS: positive for - anxiety, concentration difficulties and depression  negative for - behavioral disorder, decreased libido, disorientation, hallucinations, hostility, irritability, memory difficulties, mood swings, obsessive thoughts, physical abuse, sexual abuse, sleep disturbances or suicidal ideation      Physical Exam:   Blood pressure 140/78, pulse 55, weight 86.9 kg (191 lb 9.6 oz), SpO2 99 %, not currently breastfeeding.    Mental Status Exam:   Hygiene:   good  Cooperation:  Cooperative  Eye Contact:  Good  Psychomotor Behavior:  Appropriate  Affect:  Appropriate  Mood: normal  Hopelessness: Denies  Speech:  Normal  Thought Process:  Goal directed and Linear  Thought Content:   Normal  Suicidal:  None  Homicidal:  None  Hallucinations:  None  Delusion:  None  Memory:  Intact  Orientation:  Person, Place, Time and Situation  Reliability:  good  Insight:  Good  Judgement:  Good  Impulse Control:  Good  Physical/Medical Issues:  No        PHQ-9 Depression Screening    Little interest or pleasure in doing things? 1-->several days   Feeling down, depressed, or hopeless? 1-->several days   Trouble falling or staying asleep, or sleeping too much? 0-->not at all   Feeling tired or having little energy? 0-->not at all   Poor appetite or overeating? 0-->not at all   Feeling bad about yourself - or that you are a failure or have let yourself or your family down? 1-->several days   Trouble concentrating on things, such as reading the newspaper or watching television? 2-->more than half the days   Moving or speaking so slowly that other people could have noticed? Or the opposite - being so fidgety or restless that you have been moving around a lot more than usual? 0-->not at all   Thoughts that you would be better off dead, or of hurting yourself in some way? 0-->not at all   PHQ-9 Total Score 5   If you checked off any problems, how difficult have these problems made it for you to do your work, take care of things at home, or get along with other people? not difficult at all        Never smoker    I advised Neva of the risks of tobacco use.     Result Review:    Labs:  Office Visit on 01/17/2023   Component Date Value Ref Range Status   • HCG, Urine, QL 01/17/2023 Negative  Negative Final   • Lot Number 01/17/2023 [id number]   Final   • Internal Positive Control 01/17/2023 Passed  Positive, Passed Final   • Internal Negative Control 01/17/2023 Passed  Negative, Passed Final   • Expiration Date 01/17/2023 12/31/2023   Final       Assessment & Plan   Diagnoses and all orders for this visit:    1. Generalized anxiety disorder (Primary)  -     sertraline (Zoloft) 50 MG tablet; Take 1 tablet by mouth  Daily.  Dispense: 30 tablet; Refill: 2  -     propranolol (INDERAL) 10 MG tablet; Take 1 tablet by mouth 2 (Two) Times a Day As Needed (anxiety).  Dispense: 60 tablet; Refill: 1    2. Attention deficit hyperactivity disorder, combined type    Continue sertraline, but decrease to 50mg daily.   Start propranolol 10mg, twice daily, as needed for anxiety.   Continue Adderall 20mg IR, twice daily. No refill needed today.      Visit Diagnoses:    ICD-10-CM ICD-9-CM   1. Generalized anxiety disorder  F41.1 300.02   2. Attention deficit hyperactivity disorder, combined type  F90.2 314.01       TREATMENT PLAN/GOALS: Continue supportive psychotherapy efforts and medications as indicated. Treatment and medication options discussed during today's visit. Patient ackowledged and verbally consented to continue with current treatment plan and was educated on the importance of compliance with treatment and follow-up appointments.    MEDICATION ISSUES:  INSPECT reviewed as expected    UDS 10/12/22 consistent.     Discussed medication options and treatment plan of prescribed medication as well as the risks, benefits, and side effects including potential falls, possible impaired driving and metabolic adversities among others. Patient is agreeable to call the office with any worsening of symptoms or onset of side effects. Patient is agreeable to call 911 or go to the nearest ER should he/she begin having SI/HI. No medication side effects or related complaints today.     MEDS ORDERED DURING VISIT:  New Medications Ordered This Visit   Medications   • sertraline (Zoloft) 50 MG tablet     Sig: Take 1 tablet by mouth Daily.     Dispense:  30 tablet     Refill:  2   • propranolol (INDERAL) 10 MG tablet     Sig: Take 1 tablet by mouth 2 (Two) Times a Day As Needed (anxiety).     Dispense:  60 tablet     Refill:  1       Return in about 3 months (around 5/14/2023).         This document has been electronically signed by Tyesha Frank  APRN  February 14, 2023 14:50 EST    Part of this note may be an electronic transcription/translation of spoken language to printed text using the Dragon Dictation System.

## 2023-03-08 DIAGNOSIS — F98.8 ATTENTION DEFICIT DISORDER (ADD) WITHOUT HYPERACTIVITY: Chronic | ICD-10-CM

## 2023-03-08 RX ORDER — DEXTROAMPHETAMINE SACCHARATE, AMPHETAMINE ASPARTATE, DEXTROAMPHETAMINE SULFATE AND AMPHETAMINE SULFATE 5; 5; 5; 5 MG/1; MG/1; MG/1; MG/1
1 TABLET ORAL 2 TIMES DAILY
Qty: 60 TABLET | Refills: 0 | Status: SHIPPED | OUTPATIENT
Start: 2023-03-08

## 2023-03-08 NOTE — TELEPHONE ENCOUNTER
Rx Refill Note  Requested Prescriptions     Pending Prescriptions Disp Refills   • amphetamine-dextroamphetamine (ADDERALL) 20 MG tablet 60 tablet 0     Sig: Take 1 tablet by mouth 2 (Two) Times a Day.      Last office visit with prescribing clinician: 2/14/2023      Next office visit with prescribing clinician: 5/16/2023     Office Visit with Tyesha Frank APRN (02/14/2023)    SCANNED - LABS (10/12/2022)    Inspect fill 2/11/23    Barbara Zee  03/08/23, 11:08 EST

## 2023-04-10 DIAGNOSIS — F98.8 ATTENTION DEFICIT DISORDER (ADD) WITHOUT HYPERACTIVITY: Chronic | ICD-10-CM

## 2023-04-11 RX ORDER — DEXTROAMPHETAMINE SACCHARATE, AMPHETAMINE ASPARTATE, DEXTROAMPHETAMINE SULFATE AND AMPHETAMINE SULFATE 5; 5; 5; 5 MG/1; MG/1; MG/1; MG/1
TABLET ORAL
Qty: 60 TABLET | Refills: 0 | Status: SHIPPED | OUTPATIENT
Start: 2023-04-11

## 2023-05-10 DIAGNOSIS — F98.8 ATTENTION DEFICIT DISORDER (ADD) WITHOUT HYPERACTIVITY: Chronic | ICD-10-CM

## 2023-05-10 RX ORDER — DEXTROAMPHETAMINE SACCHARATE, AMPHETAMINE ASPARTATE, DEXTROAMPHETAMINE SULFATE AND AMPHETAMINE SULFATE 5; 5; 5; 5 MG/1; MG/1; MG/1; MG/1
1 TABLET ORAL 2 TIMES DAILY
Qty: 60 TABLET | Refills: 0 | Status: SHIPPED | OUTPATIENT
Start: 2023-05-10

## 2023-05-10 NOTE — TELEPHONE ENCOUNTER
Rx Refill Note  Requested Prescriptions     Pending Prescriptions Disp Refills   • amphetamine-dextroamphetamine (ADDERALL) 20 MG tablet 60 tablet 0     Sig: Take 1 tablet by mouth 2 (Two) Times a Day.      Last office visit with prescribing clinician: 2/14/2023     Next office visit with prescribing clinician: 5/16/2023     Office Visit with Tyesha Frank APRN (02/14/2023)    SCANNED - LABS (10/12/2022)    Inspect fill 4/11/23    Barbara Zee  05/10/23, 11:24 EDT

## 2023-06-06 DIAGNOSIS — F98.8 ATTENTION DEFICIT DISORDER (ADD) WITHOUT HYPERACTIVITY: Chronic | ICD-10-CM

## 2023-06-07 RX ORDER — DEXTROAMPHETAMINE SACCHARATE, AMPHETAMINE ASPARTATE, DEXTROAMPHETAMINE SULFATE AND AMPHETAMINE SULFATE 5; 5; 5; 5 MG/1; MG/1; MG/1; MG/1
TABLET ORAL
Qty: 60 TABLET | Refills: 0 | Status: SHIPPED | OUTPATIENT
Start: 2023-06-07

## 2023-06-12 ENCOUNTER — OFFICE VISIT (OUTPATIENT)
Dept: FAMILY MEDICINE CLINIC | Facility: CLINIC | Age: 28
End: 2023-06-12
Payer: COMMERCIAL

## 2023-06-12 VITALS
HEIGHT: 69 IN | SYSTOLIC BLOOD PRESSURE: 110 MMHG | TEMPERATURE: 98 F | HEART RATE: 67 BPM | DIASTOLIC BLOOD PRESSURE: 71 MMHG | OXYGEN SATURATION: 98 % | BODY MASS INDEX: 29.15 KG/M2 | RESPIRATION RATE: 16 BRPM | WEIGHT: 196.8 LBS

## 2023-06-12 DIAGNOSIS — F41.1 GAD (GENERALIZED ANXIETY DISORDER): Chronic | ICD-10-CM

## 2023-06-12 DIAGNOSIS — Z79.899 HIGH RISK MEDICATION USE: Primary | ICD-10-CM

## 2023-06-12 RX ORDER — ESCITALOPRAM OXALATE 10 MG/1
10 TABLET ORAL DAILY
Qty: 30 TABLET | Refills: 0 | Status: SHIPPED | OUTPATIENT
Start: 2023-06-12

## 2023-06-12 RX ORDER — HYDROXYZINE HYDROCHLORIDE 10 MG/1
10 TABLET, FILM COATED ORAL 2 TIMES DAILY PRN
Qty: 30 TABLET | Refills: 0 | Status: SHIPPED | OUTPATIENT
Start: 2023-06-12

## 2023-06-12 NOTE — PROGRESS NOTES
Subjective   Chief Complaint   Patient presents with    Anxiety    Insomnia    Discussed medications     Neva Whitaker is a 27 y.o. female.     Patient Care Team:  Chika Marie MD as PCP - General (Family Medicine)  Jessica Holbrook MD as Consulting Physician (Obstetrics and Gynecology)    History of Present Illness  She is coming in today to discuss her anxiety.  She has been dealing with anxiety since her teenage years.  She was started on Zoloft about 10 years ago, and decided to discontinued it altogether about a month ago as she did not feel medication was working.  She also tells me that while on Zoloft she was very inconsistent with that and Stopping to take the medicine as she thought she did not really need it.  Since she discontinued Zoloft her anxiety is much worse.  It affects her daily functioning and her sleep.  She apparently started taking some supplement to help with anxiety as she wanted to go all-natural.  She reports that the name of the supplement was [employer].  It is not helping.  She is currently seen by psych office for her ADD and she is on Adderall.  She also was prescribed propranolol by them which she takes once a day for symptoms of her anxiety which helps some.     The following portions of the patient's history were reviewed and updated as appropriate: allergies, current medications, past family history, past medical history, past social history, past surgical history, and problem list.  Past Medical History:   Diagnosis Date    ADHD (attention deficit hyperactivity disorder)     Anxiety     Chlamydia 2017     Past Surgical History:   Procedure Laterality Date    BREAST AUGMENTATION      TONSILLECTOMY       The patient has a family history of  Family History   Problem Relation Age of Onset    Heart failure Paternal Grandfather     Colon cancer Maternal Grandmother         76    Breast cancer Neg Hx     Ovarian cancer Neg Hx     Uterine cancer Neg Hx     Deep vein thrombosis  "Neg Hx     Pulmonary embolism Neg Hx      Social History     Socioeconomic History    Marital status: Single   Tobacco Use    Smoking status: Never    Smokeless tobacco: Never   Vaping Use    Vaping Use: Never used   Substance and Sexual Activity    Alcohol use: Not Currently     Comment: OCC    Drug use: No    Sexual activity: Yes     Partners: Male     Birth control/protection: I.U.D.     Comment: Hortensia 12/2017       Review of Systems   Constitutional:  Negative for activity change, fatigue and fever.   Respiratory:  Negative for shortness of breath and wheezing.    Cardiovascular:  Negative for chest pain, palpitations and leg swelling.   Neurological:  Negative for tremors and headache.   Psychiatric/Behavioral:  Positive for stress. The patient is nervous/anxious.    Visit Vitals  /71 (BP Location: Left arm, Patient Position: Sitting, Cuff Size: Adult)   Pulse 67   Temp 98 °F (36.7 °C) (Infrared)   Resp 16   Ht 175.3 cm (69\")   Wt 89.3 kg (196 lb 12.8 oz) Comment: with steel toeboots   SpO2 98%   BMI 29.06 kg/m²       BMI is >= 25 and <30. (Overweight) The following options were offered after discussion;: exercise counseling/recommendations      Current Outpatient Medications:     amphetamine-dextroamphetamine (ADDERALL) 20 MG tablet, TAKE 1 TABLET BY MOUTH 2 TIMES A DAY. (Patient taking differently: 1 tablet. She uses when she goes to school), Disp: 60 tablet, Rfl: 0    levonorgestrel (KYLEENA) 19.5 MG intrauterine device IUD, To be inserted one time by prescriber by Intrauterine route., Disp: 1 each, Rfl: 0    propranolol (INDERAL) 10 MG tablet, Take 1 tablet by mouth 2 (Two) Times a Day As Needed (anxiety)., Disp: 60 tablet, Rfl: 1    escitalopram (Lexapro) 10 MG tablet, Take 1 tablet by mouth Daily., Disp: 30 tablet, Rfl: 0    hydrOXYzine (ATARAX) 10 MG tablet, Take 1 tablet by mouth 2 (Two) Times a Day As Needed for Anxiety., Disp: 30 tablet, Rfl: 0    Objective   Physical Exam  Vitals and nursing " note reviewed.   Constitutional:       General: She is not in acute distress.     Appearance: Normal appearance. She is well-developed. She is not ill-appearing.   HENT:      Head: Normocephalic and atraumatic.   Cardiovascular:      Rate and Rhythm: Normal rate and regular rhythm.      Heart sounds: Normal heart sounds. No murmur heard.    No gallop.   Pulmonary:      Effort: Pulmonary effort is normal. No respiratory distress.      Breath sounds: Normal breath sounds. No wheezing, rhonchi or rales.   Chest:      Chest wall: No tenderness.   Musculoskeletal:      Cervical back: Normal range of motion and neck supple.   Neurological:      General: No focal deficit present.      Mental Status: She is alert and oriented to person, place, and time. Mental status is at baseline.   Psychiatric:         Mood and Affect: Mood normal.      Comments: She is pleasant, cooperative, she has good eye contact.  She is anxious and tearful.       Assessment & Plan   Diagnoses and all orders for this visit:    1. High risk medication use (Primary)    2. COLTON (generalized anxiety disorder)  -     escitalopram (Lexapro) 10 MG tablet; Take 1 tablet by mouth Daily.  Dispense: 30 tablet; Refill: 0  -     hydrOXYzine (ATARAX) 10 MG tablet; Take 1 tablet by mouth 2 (Two) Times a Day As Needed for Anxiety.  Dispense: 30 tablet; Refill: 0      Her symptoms of anxiety are poorly controlled.  I will be starting her on Lexapro and I advised the patient to take 5 mg for the first 10 days and then increase to 10 mg.  I also gave her prescription for some hydroxyzine to take as needed.  I will reevaluate her symptoms in 1 month.      Return in about 1 month (around 7/12/2023) for Next scheduled follow up.    Requested Prescriptions     Signed Prescriptions Disp Refills    escitalopram (Lexapro) 10 MG tablet 30 tablet 0     Sig: Take 1 tablet by mouth Daily.    hydrOXYzine (ATARAX) 10 MG tablet 30 tablet 0     Sig: Take 1 tablet by mouth 2 (Two)  Times a Day As Needed for Anxiety.

## 2023-07-12 PROBLEM — Z11.1 SCREENING-PULMONARY TB: Status: ACTIVE | Noted: 2023-07-12

## 2023-08-01 DIAGNOSIS — F98.8 ATTENTION DEFICIT DISORDER (ADD) WITHOUT HYPERACTIVITY: Chronic | ICD-10-CM

## 2023-08-01 RX ORDER — DEXTROAMPHETAMINE SACCHARATE, AMPHETAMINE ASPARTATE, DEXTROAMPHETAMINE SULFATE AND AMPHETAMINE SULFATE 5; 5; 5; 5 MG/1; MG/1; MG/1; MG/1
1 TABLET ORAL 2 TIMES DAILY
Qty: 60 TABLET | Refills: 0 | Status: SHIPPED | OUTPATIENT
Start: 2023-08-01

## 2023-08-11 ENCOUNTER — TELEPHONE (OUTPATIENT)
Dept: FAMILY MEDICINE CLINIC | Facility: CLINIC | Age: 28
End: 2023-08-11

## 2023-08-11 ENCOUNTER — OFFICE VISIT (OUTPATIENT)
Dept: FAMILY MEDICINE CLINIC | Facility: CLINIC | Age: 28
End: 2023-08-11
Payer: COMMERCIAL

## 2023-08-11 VITALS
BODY MASS INDEX: 28.14 KG/M2 | SYSTOLIC BLOOD PRESSURE: 109 MMHG | DIASTOLIC BLOOD PRESSURE: 71 MMHG | RESPIRATION RATE: 16 BRPM | HEIGHT: 69 IN | HEART RATE: 52 BPM | WEIGHT: 190 LBS | TEMPERATURE: 97.5 F | OXYGEN SATURATION: 98 %

## 2023-08-11 DIAGNOSIS — Z79.899 HIGH RISK MEDICATION USE: Primary | ICD-10-CM

## 2023-08-11 DIAGNOSIS — F41.1 GAD (GENERALIZED ANXIETY DISORDER): Chronic | ICD-10-CM

## 2023-08-11 RX ORDER — PROPRANOLOL HYDROCHLORIDE 10 MG/1
10 TABLET ORAL 2 TIMES DAILY PRN
Qty: 60 TABLET | Refills: 1 | Status: SHIPPED | OUTPATIENT
Start: 2023-08-11

## 2023-08-11 NOTE — PROGRESS NOTES
Subjective   Chief Complaint   Patient presents with    medication check    Anxiety    Med Refill     Neva Whitaker is a 27 y.o. female.     Patient Care Team:  Chika Marie MD as PCP - General (Family Medicine)  Jessica Holbrook MD as Consulting Physician (Obstetrics and Gynecology)    History of Present Illness  She is coming in today to follow-up on her anxiety and her medications.  She was started on BuSpar 1 month ago and has been taking 5 mg 2 times a day consistently.  Her anxiety is still not well-controlled.  She has better and then worse days.  She recently started school and this is causing more stress.  She previously was on Zoloft few years ago and she would like to try this medicine again.  She tells me that for quite some time she used to see a counselor in [place] which she felt like it was helping, but she has not gone for couple of years.  She tried a new counselor in [place] but she does not feel that there was a good clinic.  She is worried about a lot of things and anxiety sometimes takes over and affect her daily functioning.     The following portions of the patient's history were reviewed and updated as appropriate: allergies, current medications, past family history, past medical history, past social history, past surgical history, and problem list.  Past Medical History:   Diagnosis Date    ADHD (attention deficit hyperactivity disorder)     Anxiety     Chlamydia 2017     Past Surgical History:   Procedure Laterality Date    BREAST AUGMENTATION      TONSILLECTOMY       The patient has a family history of  Family History   Problem Relation Age of Onset    Heart failure Paternal Grandfather     Colon cancer Maternal Grandmother         76    Breast cancer Neg Hx     Ovarian cancer Neg Hx     Uterine cancer Neg Hx     Deep vein thrombosis Neg Hx     Pulmonary embolism Neg Hx      Social History     Socioeconomic History    Marital status: Single   Tobacco Use    Smoking status:  "Never    Smokeless tobacco: Never   Vaping Use    Vaping Use: Never used   Substance and Sexual Activity    Alcohol use: Not Currently     Comment: OCC    Drug use: No    Sexual activity: Yes     Partners: Male     Birth control/protection: I.U.D.     Comment: Hortensia 12/2017       Review of Systems   Constitutional:  Negative for activity change, appetite change and fatigue.   Gastrointestinal:  Negative for diarrhea, nausea and vomiting.   Skin:  Negative for dry skin, rash and skin lesions.   Psychiatric/Behavioral:  Positive for stress. Negative for agitation, behavioral problems, decreased concentration, dysphoric mood, hallucinations, self-injury, sleep disturbance, suicidal ideas, negative for hyperactivity and depressed mood. The patient is nervous/anxious.    Visit Vitals  /71 (BP Location: Left arm, Patient Position: Sitting, Cuff Size: Adult)   Pulse 52   Temp 97.5 øF (36.4 øC) (Infrared)   Resp 16   Ht 175.3 cm (69.02\")   Wt 86.2 kg (190 lb)   SpO2 98%   BMI 28.05 kg/mý              Current Outpatient Medications:     amphetamine-dextroamphetamine (ADDERALL) 20 MG tablet, Take 1 tablet by mouth 2 (Two) Times a Day., Disp: 60 tablet, Rfl: 0    levonorgestrel (KYLEENA) 19.5 MG intrauterine device IUD, To be inserted one time by prescriber by Intrauterine route., Disp: 1 each, Rfl: 0    propranolol (INDERAL) 10 MG tablet, Take 1 tablet by mouth 2 (Two) Times a Day As Needed (anxiety)., Disp: 60 tablet, Rfl: 1    sertraline (Zoloft) 50 MG tablet, Take 1 tablet by mouth Daily., Disp: 30 tablet, Rfl: 0    Objective   Physical Exam  Constitutional:       General: She is not in acute distress.     Appearance: Normal appearance. She is well-developed. She is not ill-appearing or diaphoretic.      Comments: Patient is in no distress, patient has normal voice and speech.  Normal respiratory effort.   HENT:      Head: Normocephalic and atraumatic.   Pulmonary:      Effort: Pulmonary effort is normal. "   Musculoskeletal:      Cervical back: Normal range of motion and neck supple.   Neurological:      General: No focal deficit present.      Mental Status: She is alert and oriented to person, place, and time. Mental status is at baseline.   Psychiatric:         Mood and Affect: Mood normal.     CBC          7/12/2023    15:32   CBC   WBC 6.14    RBC 4.10    Hemoglobin 12.9    Hematocrit 38.8    MCV 94.6    MCH 31.5    MCHC 33.2    RDW 12.6    Platelets 213      CMP          7/12/2023    15:32   CMP   Glucose 79    BUN 14    Creatinine 0.80    EGFR 104.4    Sodium 138    Potassium 4.1    Chloride 101    Calcium 9.5    Total Protein 6.6    Albumin 4.3    Globulin 2.3    Total Bilirubin 0.5    Alkaline Phosphatase 46    AST (SGOT) 23    ALT (SGPT) 9    Albumin/Globulin Ratio 1.9    BUN/Creatinine Ratio 17.5    Anion Gap 11.2      TSH          7/12/2023    15:32   TSH   TSH 0.607          Assessment & Plan   Diagnoses and all orders for this visit:    1. High risk medication use (Primary)    2. COLTON (generalized anxiety disorder)  -     sertraline (Zoloft) 50 MG tablet; Take 1 tablet by mouth Daily.  Dispense: 30 tablet; Refill: 0  -     propranolol (INDERAL) 10 MG tablet; Take 1 tablet by mouth 2 (Two) Times a Day As Needed (anxiety).  Dispense: 60 tablet; Refill: 1      Her symptoms of anxiety are still poorly controlled.  I will be starting her on sertraline.  I also refilled her propranolol which she may take as needed for vasomotor symptoms of anxiety.  I also advised the patient to schedule a counseling appointment as I believe she would benefit from it.  Reviewed and discussed with the patient her recent blood work results.      Return in about 1 month (around 9/11/2023) for Next scheduled follow up.    Requested Prescriptions     Signed Prescriptions Disp Refills    sertraline (Zoloft) 50 MG tablet 30 tablet 0     Sig: Take 1 tablet by mouth Daily.    propranolol (INDERAL) 10 MG tablet 60 tablet 1     Sig: Take 1  tablet by mouth 2 (Two) Times a Day As Needed (anxiety).

## 2023-08-11 NOTE — TELEPHONE ENCOUNTER
I called the patient and let her kmnow we do not participate with this company/ It is a scam. She was appreciative of the information

## 2023-08-11 NOTE — TELEPHONE ENCOUNTER
Caller: Neva Whitaker    Relationship: Self    Best call back number: [phone]     What orders are you requesting (i.e. lab or imaging): GENESIGHT TESTING    In what timeframe would the patient need to come in: AS SOON AS POSSIBLE     Where will you receive your lab/imaging services: IN OFFICE    Additional notes:

## 2023-08-15 ENCOUNTER — LAB (OUTPATIENT)
Dept: LAB | Facility: HOSPITAL | Age: 28
End: 2023-08-15
Payer: COMMERCIAL

## 2023-08-15 DIAGNOSIS — Z11.1 SCREENING-PULMONARY TB: ICD-10-CM

## 2023-08-15 PROCEDURE — 86480 TB TEST CELL IMMUN MEASURE: CPT

## 2023-08-15 PROCEDURE — 36415 COLL VENOUS BLD VENIPUNCTURE: CPT

## 2023-08-16 ENCOUNTER — TELEPHONE (OUTPATIENT)
Dept: FAMILY MEDICINE CLINIC | Facility: CLINIC | Age: 28
End: 2023-08-16

## 2023-08-16 NOTE — TELEPHONE ENCOUNTER
Caller: Julianne, Neva    Relationship: Self    Best call back number: [phone]     Caller requesting test results: NEVA    What test was performed: LABS    When was the test performed: 8/15/23    Where was the test performed: IN OFFICE    Additional notes:

## 2023-08-16 NOTE — TELEPHONE ENCOUNTER
Patient was notified and verbally understood that Lab is not back yet/ takes several days for specialty lab

## 2023-08-17 ENCOUNTER — TELEPHONE (OUTPATIENT)
Dept: FAMILY MEDICINE CLINIC | Facility: CLINIC | Age: 28
End: 2023-08-17

## 2023-08-17 LAB
GAMMA INTERFERON BACKGROUND BLD IA-ACNC: 0.37 IU/ML
M TB IFN-G BLD-IMP: NEGATIVE
M TB IFN-G CD4+ T-CELLS BLD-ACNC: 0.35 IU/ML
M TBIFN-G CD4+ CD8+T-CELLS BLD-ACNC: 0.24 IU/ML
MITOGEN IGNF BLD-ACNC: >10 IU/ML
QUANTIFERON INCUBATION: NORMAL
SERVICE CMNT-IMP: NORMAL

## 2023-08-17 NOTE — PROGRESS NOTES
I called the patient and left a detailed voicemail that results of Quantiferon TB was negative. And that Dr. Marie is out this week and can not sign her form till Monday. ( Patient told me yesterday she needs the form Monday. I will check and see if Dr. Art can sign

## 2023-08-17 NOTE — TELEPHONE ENCOUNTER
Caller: Neva Whitaker    Relationship: Self    Best call back number: [phone]    PATIENT IS RETURNING A MISSED CALL.     PATIENT IS UNSURE WHO REACHED OUT TO HER.    HUB ATTEMPTED TO WARM TRANSFER PATIENT TO THE OFFICE AND WAS UNSUCCESSFUL.    PLEASE ADVISE

## 2023-08-23 ENCOUNTER — TELEPHONE (OUTPATIENT)
Dept: FAMILY MEDICINE CLINIC | Facility: CLINIC | Age: 28
End: 2023-08-23

## 2023-08-23 NOTE — TELEPHONE ENCOUNTER
Caller: Neva Whitaker    Relationship: Self    Best call back number: [phone]    PATIENT IS NEEDING TO KNOW WHEN HER LAST TDAP WAS AND NEEDS SIGNED PROOF OF THIS.    PLEASE GIVE HER A CALLBACK

## 2023-08-23 NOTE — TELEPHONE ENCOUNTER
I called and informed patient secure voicemail that her last TDAP was 08/04/2022 and I printed her immunization for her and signed/

## 2023-09-08 ENCOUNTER — TELEPHONE (OUTPATIENT)
Dept: FAMILY MEDICINE CLINIC | Facility: CLINIC | Age: 28
End: 2023-09-08

## 2023-09-08 ENCOUNTER — OFFICE VISIT (OUTPATIENT)
Dept: FAMILY MEDICINE CLINIC | Facility: CLINIC | Age: 28
End: 2023-09-08
Payer: COMMERCIAL

## 2023-09-08 VITALS
SYSTOLIC BLOOD PRESSURE: 111 MMHG | HEART RATE: 63 BPM | TEMPERATURE: 96.4 F | HEIGHT: 69 IN | DIASTOLIC BLOOD PRESSURE: 69 MMHG | RESPIRATION RATE: 16 BRPM | BODY MASS INDEX: 26.66 KG/M2 | OXYGEN SATURATION: 98 % | WEIGHT: 180 LBS

## 2023-09-08 DIAGNOSIS — Z79.899 HIGH RISK MEDICATION USE: Primary | ICD-10-CM

## 2023-09-08 DIAGNOSIS — F41.1 GAD (GENERALIZED ANXIETY DISORDER): Chronic | ICD-10-CM

## 2023-09-08 RX ORDER — SERTRALINE HYDROCHLORIDE 100 MG/1
100 TABLET, FILM COATED ORAL DAILY
Qty: 90 TABLET | Refills: 0 | Status: SHIPPED | OUTPATIENT
Start: 2023-09-08

## 2023-09-08 NOTE — PROGRESS NOTES
Subjective   Chief Complaint   Patient presents with    Med Refill    Anxiety     Neva hWitaker is a 27 y.o. female.     Patient Care Team:  Chika Marie MD as PCP - General (Family Medicine)  Jessica Holbrook MD as Consulting Physician (Obstetrics and Gynecology)    History of Present Illness  She is coming in today to follow-up on her anxiety and her medication.  She was started on sertraline 50 mg 1 month ago.  She tells me that she is doing much better and her symptoms are definitely better controlled.  She is able to cope with the stress better.  Her symptoms improved both 50% and she still has some residual anxiety.  She is in school now and she still works part-time which creates some stress.     The following portions of the patient's history were reviewed and updated as appropriate: allergies, current medications, past family history, past medical history, past social history, past surgical history, and problem list.  Past Medical History:   Diagnosis Date    ADHD (attention deficit hyperactivity disorder)     Anxiety     Chlamydia 2017     Past Surgical History:   Procedure Laterality Date    BREAST AUGMENTATION      TONSILLECTOMY       The patient has a family history of  Family History   Problem Relation Age of Onset    Heart failure Paternal Grandfather     Colon cancer Maternal Grandmother         76    Breast cancer Neg Hx     Ovarian cancer Neg Hx     Uterine cancer Neg Hx     Deep vein thrombosis Neg Hx     Pulmonary embolism Neg Hx      Social History     Socioeconomic History    Marital status: Single   Tobacco Use    Smoking status: Never    Smokeless tobacco: Never   Vaping Use    Vaping Use: Never used   Substance and Sexual Activity    Alcohol use: Not Currently     Comment: OCC    Drug use: No    Sexual activity: Yes     Partners: Male     Birth control/protection: I.U.D.     Comment: Hortensia 12/2017       Review of Systems   Constitutional:  Negative for activity change, appetite  "change and fatigue.   Gastrointestinal:  Negative for diarrhea, nausea and vomiting.   Skin:  Negative for dry skin, rash and skin lesions.   Psychiatric/Behavioral:  Negative for agitation, behavioral problems, decreased concentration, dysphoric mood, hallucinations, self-injury, sleep disturbance, suicidal ideas, negative for hyperactivity, depressed mood and stress. The patient is nervous/anxious.    Visit Vitals  /69 (BP Location: Left arm, Patient Position: Sitting, Cuff Size: Adult)   Pulse 63   Temp 96.4 °F (35.8 °C) (Infrared)   Resp 16   Ht 175.3 cm (69.02\")   Wt 81.6 kg (180 lb)   SpO2 98%   BMI 26.57 kg/m²              Current Outpatient Medications:     amphetamine-dextroamphetamine (ADDERALL) 20 MG tablet, Take 1 tablet by mouth 2 (Two) Times a Day., Disp: 60 tablet, Rfl: 0    levonorgestrel (KYLEENA) 19.5 MG intrauterine device IUD, To be inserted one time by prescriber by Intrauterine route., Disp: 1 each, Rfl: 0    propranolol (INDERAL) 10 MG tablet, Take 1 tablet by mouth 2 (Two) Times a Day As Needed (anxiety)., Disp: 60 tablet, Rfl: 1    sertraline (Zoloft) 100 MG tablet, Take 1 tablet by mouth Daily., Disp: 90 tablet, Rfl: 0    Objective   Physical Exam  Constitutional:       General: She is not in acute distress.     Appearance: Normal appearance. She is well-developed. She is not ill-appearing or diaphoretic.      Comments: Patient is in no distress, patient has normal voice and speech.  Normal respiratory effort.   HENT:      Head: Normocephalic and atraumatic.   Pulmonary:      Effort: Pulmonary effort is normal.   Musculoskeletal:      Cervical back: Normal range of motion and neck supple.   Neurological:      General: No focal deficit present.      Mental Status: She is alert and oriented to person, place, and time. Mental status is at baseline.   Psychiatric:         Mood and Affect: Mood normal.       Assessment & Plan   Diagnoses and all orders for this visit:    1. High risk " medication use (Primary)    2. COLTON (generalized anxiety disorder)  -     Discontinue: sertraline (Zoloft) 50 MG tablet; Take 1 tablet by mouth Daily.  Dispense: 90 tablet; Refill: 0  -     sertraline (Zoloft) 100 MG tablet; Take 1 tablet by mouth Daily.  Dispense: 90 tablet; Refill: 0      Patient had a good response to sertraline 50 mg.  Her symptoms are better controlled, but she still has some residual symptoms.  I will be increasing sertraline from 50 mg to 100 mg      Return in about 3 months (around 12/8/2023) for Next scheduled follow up.    Requested Prescriptions     Signed Prescriptions Disp Refills    sertraline (Zoloft) 100 MG tablet 90 tablet 0     Sig: Take 1 tablet by mouth Daily.

## 2023-09-08 NOTE — TELEPHONE ENCOUNTER
Caller: Neva Whitaker    Relationship to patient: Self    Best call back number: [phone]    Patient is needing:     WOULD LIKE A CALL FROM ELZA CROWLEY TO DISCUSS IF SHE HAS TO COME IN FOR HER 1 MONTH FOLLOW UP FOR ZOLOFT.     SHE STATES SHE IS DOING FINE, AND IT IS WORKING, JUST NEEDS A REFILL.     WOULD ALSO LIKE TO DISCUSS HOW TO GO ABOUT GETTING HER TB TEST RESULTS.

## 2023-09-11 DIAGNOSIS — F98.8 ATTENTION DEFICIT DISORDER (ADD) WITHOUT HYPERACTIVITY: Chronic | ICD-10-CM

## 2023-09-12 RX ORDER — DEXTROAMPHETAMINE SACCHARATE, AMPHETAMINE ASPARTATE, DEXTROAMPHETAMINE SULFATE AND AMPHETAMINE SULFATE 5; 5; 5; 5 MG/1; MG/1; MG/1; MG/1
TABLET ORAL
Qty: 60 TABLET | Refills: 0 | Status: SHIPPED | OUTPATIENT
Start: 2023-09-12

## 2023-09-27 ENCOUNTER — OFFICE VISIT (OUTPATIENT)
Dept: PSYCHIATRY | Facility: CLINIC | Age: 28
End: 2023-09-27
Payer: COMMERCIAL

## 2023-09-27 DIAGNOSIS — F41.1 GENERALIZED ANXIETY DISORDER: Primary | Chronic | ICD-10-CM

## 2023-09-27 DIAGNOSIS — F98.8 ATTENTION DEFICIT DISORDER (ADD) WITHOUT HYPERACTIVITY: Chronic | ICD-10-CM

## 2023-09-27 NOTE — PROGRESS NOTES
Subjective   Neva Whitaker is a 27 y.o. female who presents today for follow-up for psychiatric medication management.     Chief Complaint:  Patient is here to follow up on anxiety and ADHD.     History of Present Illness:     Medication adjustments last visit:   Continue sertraline, but decrease to 50mg daily.   Start propranolol 10mg, twice daily, as needed for anxiety.   Continue Adderall 20mg IR, twice daily. No refill needed today.        She hasn't been doing propanolol as much. States she hasn't needed it.   She was crying all the time this summer. She was working at [employer] full time and this was stressful for her.   She has changed jobs. 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   She has tried buspirone in the past. She has been on lexapro in the past. Neither of those worked for her.   She has done counseling in the past. We discussed possibly doing counseling again. She is going to think about it.   She is in PT school. Doing well.  She feels like her medications are working ok right now.     Previous visit:   At today's visit, the patient states there has been a lot of circumstantial things going on in her life that have increased her stress and anxiety.   Someone just passed away in her [place].   Grandma had a stroke.   She said she had a panic attack a few weeks ago. Says she couldn't calm down. She says there was other factors going on. She was drinking the weekend before. She also took a CBD gummy, a quarter of it. She feels like this increased her anxiety.   She also got her wisdom teeth out.   She is taking Adderall 20mg IR twice daily. She has only been taking as needed. She feels like her anxiety is less when she takes it. We discussed taking it every day, if it helps her anxiety.   Taking Sertraline 100mg daily.   She has a fear of brain cancer. No one in her family had it. It is just a fear she has.   She is in physical therapy assistant school. She didn't pass last  semester. She feels like she put too much pressure on herself. She is doing better this semester.   Only taking Adderall IR 20mg when she absolutely needs it. But she thinks she feels better when she takes it.   She is only taking 50mg sertraline. She feels like she didn't have any libido on 100mg. She feels like the 50mg is working ok.    We discussed trying propranolol as an as needed medication for anxiety. She was agreeable to this.     Past medical history: anxiety, ADHD  Past psychiatric history: anxiety, ADHD  Family history: None pertinent.   Social history: Patient is not a smoker, she reports occasional alcohol use, and no drug use.     Ability and capacity to respond to treatment: Good  Functional status: Good  Prognosis: Good  Long term goals: Decrease anxiety and improve overall quality of life.   Short term goals: Improve focus and decrease anxiety.   Strengths: Patient is compliant with medications.   Weaknesses: Patient has a lot of personal stressors in her life.     Patient presents with symptoms and behaviors that are consistent with the following DSM-5 diagnoses:  1. Generalized anxiety disorder  2. ADHD    The following portions of the patient's history were reviewed and updated as appropriate: allergies, current medications, past family history, past medical history, past social history, past surgical history and problem list.    PAST OUTPATIENT TREATMENT  Diagnosis treated:  Anxiety/Panic Disorder, ADD  Treatment Type:  Medication Management  Prior Psychiatric Medications:  Zoloft - incomplete efficacy at 50mg.  20mg Adderall XR - poor appetite, worrying.  Hydroxyzine - makes sleepy.  Strattera - didn't like how it made her feel.  Support Groups:  None  Sequelae Of Mental Disorder:  emotional distress    Interval History  Improved    Side Effects  None      Past Medical History:  Past Medical History:   Diagnosis Date    ADHD (attention deficit hyperactivity disorder)     Anxiety     Chlamydia  2017       Social History:  Social History     Socioeconomic History    Marital status: Single   Tobacco Use    Smoking status: Never    Smokeless tobacco: Never   Vaping Use    Vaping Use: Never used   Substance and Sexual Activity    Alcohol use: Not Currently     Comment: OCC    Drug use: No    Sexual activity: Yes     Partners: Male     Birth control/protection: I.U.D.     Comment: Hortensia 12/2017       Family History:  Family History   Problem Relation Age of Onset    Heart failure Paternal Grandfather     Colon cancer Maternal Grandmother         76    Breast cancer Neg Hx     Ovarian cancer Neg Hx     Uterine cancer Neg Hx     Deep vein thrombosis Neg Hx     Pulmonary embolism Neg Hx        Past Surgical History:  Past Surgical History:   Procedure Laterality Date    BREAST AUGMENTATION      TONSILLECTOMY         Problem List:  Patient Active Problem List   Diagnosis    Chronic bilateral low back pain without sciatica    Preventative health care    COLTON (generalized anxiety disorder)    Attention deficit disorder (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 hyperactivity    High risk medication use    Screening-pulmonary TB       Allergy:   Allergies   Allergen Reactions    Lexapro [Escitalopram] Unknown - Low Severity        Discontinued Medications:  Medications Discontinued During This Encounter   Medication Reason    sertraline (Zoloft) 100 MG tablet          Current Medications:   Current Outpatient Medications   Medication Sig Dispense Refill    amphetamine-dextroamphetamine (ADDERALL) 20 MG tablet TAKE 1 TABLET BY MOUTH 2 TIMES A DAY 60 tablet 0    levonorgestrel (KYLEENA) 19.5 MG intrauterine device IUD To be inserted one time by prescriber by Intrauterine route. 1 each 0    propranolol (INDERAL) 10 MG tablet Take 1 tablet by mouth 2 (Two) Times a Day As Needed (anxiety). 60 tablet 1     No current facility-administered medications for this visit.         Psychological ROS: positive for - anxiety, concentration difficulties and  depression  negative for - behavioral disorder, decreased libido, disorientation, hallucinations, hostility, irritability, memory difficulties, mood swings, obsessive thoughts, physical abuse, sexual abuse, sleep disturbances or suicidal ideation      Physical Exam:   not currently breastfeeding.    Mental Status Exam:   Hygiene:   good  Cooperation:  Cooperative  Eye Contact:  Good  Psychomotor Behavior:  Appropriate  Affect:  Appropriate  Mood: normal  Hopelessness: Denies  Speech:  Normal  Thought Process:  Goal directed and Linear  Thought Content:  Normal  Suicidal:  None  Homicidal:  None  Hallucinations:  None  Delusion:  None  Memory:  Intact  Orientation:  Person, Place, Time and Situation  Reliability:  good  Insight:  Good  Judgement:  Good  Impulse Control:  Good  Physical/Medical Issues:  No      Mental status exam was reviewed and compared to visit on 2/14/23 and appropriate updates were made.     PHQ-9 Depression Screening    Little interest or pleasure in doing things? 2-->more than half the days   Feeling down, depressed, or hopeless? 1-->several days   Trouble falling or staying asleep, or sleeping too much? 0-->not at all   Feeling tired or having little energy? 1-->several days   Poor appetite or overeating? 0-->not at all   Feeling bad about yourself - or that you are a failure or have let yourself or your family down? 1-->several days   Trouble concentrating on things, such as reading the newspaper or watching television? 2-->more than half the days   Moving or speaking so slowly that other people could have noticed? Or the opposite - being so fidgety or restless that you have been moving around a lot more than usual? 0-->not at all   Thoughts that you would be better off dead, or of hurting yourself in some way? 0-->not at all   PHQ-9 Total Score 7   If you checked off any problems, how difficult have these problems made it for you to do your work, take care of things at home, or get along  with other people? somewhat difficult        Never smoker    I advised Neva of the risks of tobacco use.     Result Review:    Labs:  Lab on 08/15/2023   Component Date Value Ref Range Status    QuantiFERON Incubation 08/15/2023 Incubation performed.   Final    QUANTIFERON-TB GOLD PLUS 08/15/2023 Negative  Negative Final    No response to M tuberculosis antigens detected.  Infection with M tuberculosis is unlikely, but high risk  individuals should be considered for additional testing  (ATS/IDSA/CDC Clinical Practice Guidelines, 2017). The  reference range is an Antigen minus Nil result of <0.35 IU/mL.  Chemiluminescence immunoassay methodology    QuantiFERON Criteria 08/15/2023 Comment   Final    QuantiFERON-TB Gold Plus is a qualitative indirect test for  M tuberculosis infection (including disease) and is intended for use  in conjunction with risk assessment, radiography, and other medical  and diagnostic evaluations. The QuantiFERON-TB Gold Plus result is  determined by subtracting the Nil value from either TB antigen (Ag)  value. The Mitogen tube serves as a control for the test.    QUANTIFERON TB1 AG VALUE 08/15/2023 0.35  IU/mL Final    QUANTIFERON TB2 AG VALUE 08/15/2023 0.24  IU/mL Final    QuantiFERON Nil Value 08/15/2023 0.37  IU/mL Final    QuantiFERON Mitogen Value 08/15/2023 >10.00  IU/mL Final   Office Visit on 07/12/2023   Component Date Value Ref Range Status    WBC 07/12/2023 6.14  3.40 - 10.80 10*3/mm3 Final    RBC 07/12/2023 4.10  3.77 - 5.28 10*6/mm3 Final    Hemoglobin 07/12/2023 12.9  12.0 - 15.9 g/dL Final    Hematocrit 07/12/2023 38.8  34.0 - 46.6 % Final    MCV 07/12/2023 94.6  79.0 - 97.0 fL Final    MCH 07/12/2023 31.5  26.6 - 33.0 pg Final    MCHC 07/12/2023 33.2  31.5 - 35.7 g/dL Final    RDW 07/12/2023 12.6  12.3 - 15.4 % Final    RDW-SD 07/12/2023 43.5  37.0 - 54.0 fl Final    MPV 07/12/2023 10.8  6.0 - 12.0 fL Final    Platelets 07/12/2023 213  140 - 450 10*3/mm3 Final     Neutrophil % 07/12/2023 56.7  42.7 - 76.0 % Final    Lymphocyte % 07/12/2023 32.4  19.6 - 45.3 % Final    Monocyte % 07/12/2023 8.8  5.0 - 12.0 % Final    Eosinophil % 07/12/2023 1.5  0.3 - 6.2 % Final    Basophil % 07/12/2023 0.3  0.0 - 1.5 % Final    Immature Grans % 07/12/2023 0.3  0.0 - 0.5 % Final    Neutrophils, Absolute 07/12/2023 3.48  1.70 - 7.00 10*3/mm3 Final    Lymphocytes, Absolute 07/12/2023 1.99  0.70 - 3.10 10*3/mm3 Final    Monocytes, Absolute 07/12/2023 0.54  0.10 - 0.90 10*3/mm3 Final    Eosinophils, Absolute 07/12/2023 0.09  0.00 - 0.40 10*3/mm3 Final    Basophils, Absolute 07/12/2023 0.02  0.00 - 0.20 10*3/mm3 Final    Immature Grans, Absolute 07/12/2023 0.02  0.00 - 0.05 10*3/mm3 Final    nRBC 07/12/2023 0.2  0.0 - 0.2 /100 WBC Final    Glucose 07/12/2023 79  65 - 99 mg/dL Final    BUN 07/12/2023 14  6 - 20 mg/dL Final    Creatinine 07/12/2023 0.80  0.57 - 1.00 mg/dL Final    Sodium 07/12/2023 138  136 - 145 mmol/L Final    Potassium 07/12/2023 4.1  3.5 - 5.2 mmol/L Final    Chloride 07/12/2023 101  98 - 107 mmol/L Final    CO2 07/12/2023 25.8  22.0 - 29.0 mmol/L Final    Calcium 07/12/2023 9.5  8.6 - 10.5 mg/dL Final    Total Protein 07/12/2023 6.6  6.0 - 8.5 g/dL Final    Albumin 07/12/2023 4.3  3.5 - 5.2 g/dL Final    ALT (SGPT) 07/12/2023 9  1 - 33 U/L Final    AST (SGOT) 07/12/2023 23  1 - 32 U/L Final    Alkaline Phosphatase 07/12/2023 46  39 - 117 U/L Final    Total Bilirubin 07/12/2023 0.5  0.0 - 1.2 mg/dL Final    Globulin 07/12/2023 2.3  gm/dL Final    A/G Ratio 07/12/2023 1.9  g/dL Final    BUN/Creatinine Ratio 07/12/2023 17.5  7.0 - 25.0 Final    Anion Gap 07/12/2023 11.2  5.0 - 15.0 mmol/L Final    eGFR 07/12/2023 104.4  >60.0 mL/min/1.73 Final    TSH 07/12/2023 0.607  0.270 - 4.200 uIU/mL Final    Vitamin B-12 07/12/2023 783  211 - 946 pg/mL Final    25 Hydroxy, Vitamin D 07/12/2023 35.6  30.0 - 100.0 ng/ml Final       Assessment & Plan   Diagnoses and all orders for this  visit:    1. Generalized anxiety disorder (Primary)    2. Attention deficit disorder (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 hyperactivity      Continue sertraline 50mg daily.   Continue propranolol 10mg, twice daily, as needed for anxiety. No refill needed today.   Continue Adderall 20mg IR, twice daily. No refill needed today.      Visit Diagnoses:    ICD-10-CM ICD-9-CM   1. Generalized anxiety disorder  F41.1 300.02   2. Attention deficit disorder (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 hyperactivity  F98.8 314.00         TREATMENT PLAN/GOALS: Continue supportive psychotherapy efforts and medications as indicated. Treatment and medication options discussed during today's visit. Patient ackowledged and verbally consented to continue with current treatment plan and was educated on the importance of compliance with treatment and follow-up appointments.    MEDICATION ISSUES:  INSPECT reviewed as expected    UDS 10/12/22 consistent.     Discussed medication options and treatment plan of prescribed medication as well as the risks, benefits, and side effects including potential falls, possible impaired driving and metabolic adversities among others. Patient is agreeable to call the office with any worsening of symptoms or onset of side effects. Patient is agreeable to call 911 or go to the nearest ER should he/she begin having SI/HI. No medication side effects or related complaints today.     MEDS ORDERED DURING VISIT:  No orders of the defined types were placed in this encounter.      No follow-ups on file.         This document has been electronically signed by MAIKEL Short  September 27, 2023 11:05 EDT    Part of this note may be an electronic transcription/translation of spoken language to printed text using the Dragon Dictation System.

## 2023-10-12 DIAGNOSIS — F98.8 ATTENTION DEFICIT DISORDER (ADD) WITHOUT HYPERACTIVITY: Chronic | ICD-10-CM

## 2023-10-12 RX ORDER — DEXTROAMPHETAMINE SACCHARATE, AMPHETAMINE ASPARTATE, DEXTROAMPHETAMINE SULFATE AND AMPHETAMINE SULFATE 5; 5; 5; 5 MG/1; MG/1; MG/1; MG/1
TABLET ORAL
Qty: 60 TABLET | Refills: 0 | Status: SHIPPED | OUTPATIENT
Start: 2023-10-12

## 2023-11-06 ENCOUNTER — OFFICE VISIT (OUTPATIENT)
Dept: FAMILY MEDICINE CLINIC | Facility: CLINIC | Age: 28
End: 2023-11-06
Payer: COMMERCIAL

## 2023-11-06 VITALS
BODY MASS INDEX: 27.25 KG/M2 | TEMPERATURE: 97.5 F | HEIGHT: 69 IN | DIASTOLIC BLOOD PRESSURE: 83 MMHG | HEART RATE: 58 BPM | OXYGEN SATURATION: 98 % | SYSTOLIC BLOOD PRESSURE: 133 MMHG | WEIGHT: 184 LBS | RESPIRATION RATE: 16 BRPM

## 2023-11-06 DIAGNOSIS — N30.00 ACUTE CYSTITIS WITHOUT HEMATURIA: ICD-10-CM

## 2023-11-06 DIAGNOSIS — R30.0 BURNING WITH URINATION: Primary | ICD-10-CM

## 2023-11-06 DIAGNOSIS — R30.0 DYSURIA: ICD-10-CM

## 2023-11-06 LAB
BILIRUB BLD-MCNC: NEGATIVE MG/DL
CLARITY, POC: ABNORMAL
COLOR UR: YELLOW
EXPIRATION DATE: ABNORMAL
GLUCOSE UR STRIP-MCNC: NEGATIVE MG/DL
KETONES UR QL: NEGATIVE
LEUKOCYTE EST, POC: ABNORMAL
Lab: ABNORMAL
NITRITE UR-MCNC: POSITIVE MG/ML
PH UR: 5 [PH] (ref 5–8)
PROT UR STRIP-MCNC: NEGATIVE MG/DL
RBC # UR STRIP: NEGATIVE /UL
SP GR UR: 1 (ref 1–1.03)
UROBILINOGEN UR QL: NORMAL

## 2023-11-06 PROCEDURE — 99213 OFFICE O/P EST LOW 20 MIN: CPT | Performed by: FAMILY MEDICINE

## 2023-11-06 PROCEDURE — 87186 SC STD MICRODIL/AGAR DIL: CPT | Performed by: FAMILY MEDICINE

## 2023-11-06 PROCEDURE — 81003 URINALYSIS AUTO W/O SCOPE: CPT | Performed by: FAMILY MEDICINE

## 2023-11-06 PROCEDURE — 87086 URINE CULTURE/COLONY COUNT: CPT | Performed by: FAMILY MEDICINE

## 2023-11-06 PROCEDURE — 87088 URINE BACTERIA CULTURE: CPT | Performed by: FAMILY MEDICINE

## 2023-11-06 RX ORDER — PHENAZOPYRIDINE HYDROCHLORIDE 200 MG/1
200 TABLET, FILM COATED ORAL 3 TIMES DAILY
Qty: 6 TABLET | Refills: 0 | Status: SHIPPED | OUTPATIENT
Start: 2023-11-06 | End: 2023-11-08

## 2023-11-06 RX ORDER — CIPROFLOXACIN 500 MG/1
500 TABLET, FILM COATED ORAL 2 TIMES DAILY
Qty: 14 TABLET | Refills: 0 | Status: SHIPPED | OUTPATIENT
Start: 2023-11-06 | End: 2023-11-13

## 2023-11-06 NOTE — PROGRESS NOTES
Barbara Whitaker is a 27 y.o. female.     History of Present Illness     Patient presents with urinary frequency and urgency. This is a new problem. The current episode started in the past 3 days. There has been no fever. Associated symptoms include frequency, lower abdominal pressure. Pertinent negatives include no discharge, flank pain, hematuria, nausea or vomiting.      The following portions of the patient's history were reviewed and updated as appropriate: past medical history, past social history, past surgical history and problem list.    Review of Systems   Constitutional:  Negative for fever.   Gastrointestinal:  Negative for abdominal pain, nausea and vomiting.   Genitourinary:  Positive for dysuria, frequency and urgency. Negative for flank pain, hematuria, pelvic pressure and vaginal bleeding.   Musculoskeletal:  Positive for back pain.       Objective   Physical Exam  Vitals reviewed.   Pulmonary:      Effort: Pulmonary effort is normal.   Abdominal:      Palpations: Abdomen is soft.      Tenderness: There is no abdominal tenderness. There is no right CVA tenderness or left CVA tenderness.   Neurological:      Mental Status: She is alert and oriented to person, place, and time.       Vitals:    11/06/23 1607   BP: 133/83   Pulse: 58   Resp: 16   Temp: 97.5 °F (36.4 °C)   SpO2: 98%     Current Outpatient Medications on File Prior to Visit   Medication Sig Dispense Refill    amphetamine-dextroamphetamine (ADDERALL) 20 MG tablet TAKE 1 TABLET BY MOUTH 2 TIMES DAY 60 tablet 0    levonorgestrel (KYLEENA) 19.5 MG intrauterine device IUD To be inserted one time by prescriber by Intrauterine route. 1 each 0    propranolol (INDERAL) 10 MG tablet Take 1 tablet by mouth 2 (Two) Times a Day As Needed (anxiety). 60 tablet 1    sertraline (Zoloft) 50 MG tablet Take 1 tablet by mouth Daily. 30 tablet 2    [DISCONTINUED] buPROPion XL (Wellbutrin XL) 300 MG 24 hr tablet Take 1 tablet by mouth Daily. 30  tablet 5     No current facility-administered medications on file prior to visit.           Assessment & Plan   Problems Addressed this Visit       Acute cystitis without hematuria     Advised plenty of fluids and take medicine as directed.  Call us back in 2 to 3 days if symptoms do not get better or worse.         Relevant Medications    ciprofloxacin (CIPRO) 500 MG tablet    phenazopyridine (Pyridium) 200 MG tablet    Dysuria    Relevant Orders    POCT urinalysis dipstick, automated (Completed)    Urine Culture - Urine, Urine, Clean Catch     Other Visit Diagnoses       Burning with urination    -  Primary    Relevant Orders    POCT urinalysis dipstick, automated (Completed)    Urine Culture - Urine, Urine, Clean Catch          Diagnoses         Codes Comments    Burning with urination    -  Primary ICD-10-CM: R30.0  ICD-9-CM: 788.1     Dysuria     ICD-10-CM: R30.0  ICD-9-CM: 788.1     Acute cystitis without hematuria     ICD-10-CM: N30.00  ICD-9-CM: 595.0

## 2023-11-06 NOTE — ASSESSMENT & PLAN NOTE
Advised plenty of fluids and take medicine as directed.  Call us back in 2 to 3 days if symptoms do not get better or worse.   [facility]

## 2023-11-08 LAB — BACTERIA SPEC AEROBE CULT: ABNORMAL

## 2023-11-09 DIAGNOSIS — F98.8 ATTENTION DEFICIT DISORDER (ADD) WITHOUT HYPERACTIVITY: Chronic | ICD-10-CM

## 2023-11-10 DIAGNOSIS — F98.8 ATTENTION DEFICIT DISORDER (ADD) WITHOUT HYPERACTIVITY: Chronic | ICD-10-CM

## 2023-11-10 RX ORDER — DEXTROAMPHETAMINE SACCHARATE, AMPHETAMINE ASPARTATE, DEXTROAMPHETAMINE SULFATE AND AMPHETAMINE SULFATE 5; 5; 5; 5 MG/1; MG/1; MG/1; MG/1
TABLET ORAL
Qty: 60 TABLET | Refills: 0 | OUTPATIENT
Start: 2023-11-10

## 2023-11-10 NOTE — TELEPHONE ENCOUNTER
Rx Refill Note  Requested Prescriptions     Pending Prescriptions Disp Refills    amphetamine-dextroamphetamine (ADDERALL) 20 MG tablet 60 tablet 0      Last office visit with prescribing clinician: 9/27/2023     Next office visit with prescribing clinician: 12/27/2023     Office Visit with Tyesha Frank APRN (09/27/2023)     SCANNED - LABS (10/12/2022)     Med check - 12-    Last Inspect fill: 10-    Kacey Carlin MA  11/10/23, 15:17 EST

## 2023-11-11 RX ORDER — DEXTROAMPHETAMINE SACCHARATE, AMPHETAMINE ASPARTATE, DEXTROAMPHETAMINE SULFATE AND AMPHETAMINE SULFATE 5; 5; 5; 5 MG/1; MG/1; MG/1; MG/1
20 TABLET ORAL 2 TIMES DAILY
Qty: 60 TABLET | Refills: 0 | Status: SHIPPED | OUTPATIENT
Start: 2023-11-11

## 2023-12-13 DIAGNOSIS — F98.8 ATTENTION DEFICIT DISORDER (ADD) WITHOUT HYPERACTIVITY: Chronic | ICD-10-CM

## 2023-12-14 RX ORDER — DEXTROAMPHETAMINE SACCHARATE, AMPHETAMINE ASPARTATE, DEXTROAMPHETAMINE SULFATE AND AMPHETAMINE SULFATE 5; 5; 5; 5 MG/1; MG/1; MG/1; MG/1
TABLET ORAL
Qty: 60 TABLET | Refills: 0 | Status: SHIPPED | OUTPATIENT
Start: 2023-12-14

## 2023-12-26 NOTE — PROGRESS NOTES
Subjective   Neva Whitaker is a 28 y.o. female who presents today for follow-up for psychiatric medication management.     Chief Complaint:  Patient is here to follow up on anxiety and ADHD.     History of Present Illness:     Medication adjustments last visit:   Continue sertraline 50mg daily.   Continue propranolol 10mg, twice daily, as needed for anxiety. No refill needed today.   Continue Adderall 20mg IR, twice daily. No refill needed today.      She reports she is having worse anxiety. She constantly worries something is wrong with her, she states.   States she can't sleep.   She broke up with her fiance'. She is living at her mom's house.   She found out her fiance was cheating on her.   She states she has no motivation to go to the gym.   She feels safe at her mom's house.  School is going good. She has gotten A's in her classes. She is finished with school in a year and a half.   She is taking hydroxyzine as needed for anxiety. It is 10mg. She feels like she needs a slightly higher dose.   She got her Adderall filled on 12/14, and she only has 4 pills left. She thinks her fiance was taking her medication. She states she was only taking it rarely: before tests, when she needed to get the house cleaned, etc.   She is seeing a therapist now. His name is Betty Richards. She sees him once a week.   We discussed increasing her sertraline to 100mg and hydroxyzine to 25mg TID PRN anxiety. She was having some low blood pressure, so advised to stop the propranolol.     Patient presents with symptoms and behaviors that are consistent with the following DSM-5 diagnoses:  1. Generalized anxiety disorder  2. ADHD    The following portions of the patient's history were reviewed and updated as appropriate: allergies, current medications, past family history, past medical history, past social history, past surgical history and problem list.    PAST OUTPATIENT TREATMENT  Diagnosis treated:  Anxiety/Panic Disorder,  ADD  Treatment Type:  Medication Management  Prior Psychiatric Medications:  Zoloft - incomplete efficacy at 50mg.  20mg Adderall XR - poor appetite, worrying.  Hydroxyzine - makes sleepy.  Strattera - didn't like how it made her feel.  Support Groups:  None  Sequelae Of Mental Disorder:  emotional distress    Interval History  Improved    Side Effects  None      Past Medical History:  Past Medical History:   Diagnosis Date    ADHD (attention deficit hyperactivity disorder)     Anxiety     Chlamydia 2017       Social History:  Social History     Socioeconomic History    Marital status: Single   Tobacco Use    Smoking status: Never    Smokeless tobacco: Never   Vaping Use    Vaping Use: Never used   Substance and Sexual Activity    Alcohol use: Not Currently     Comment: OCC    Drug use: No    Sexual activity: Yes     Partners: Male     Birth control/protection: I.U.D.     Comment: Hortensia 12/2017       Family History:  Family History   Problem Relation Age of Onset    Heart failure Paternal Grandfather     Colon cancer Maternal Grandmother         76    Breast cancer Neg Hx     Ovarian cancer Neg Hx     Uterine cancer Neg Hx     Deep vein thrombosis Neg Hx     Pulmonary embolism Neg Hx        Past Surgical History:  Past Surgical History:   Procedure Laterality Date    BREAST AUGMENTATION      TONSILLECTOMY         Problem List:  Patient Active Problem List   Diagnosis    Chronic bilateral low back pain without sciatica    Preventative health care    COLTON (generalized anxiety disorder)    Attention deficit disorder (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 hyperactivity    High risk medication use    Screening-pulmonary TB    Acute cystitis without hematuria    Dysuria       Allergy:   Allergies   Allergen Reactions    Lexapro [Escitalopram] Unknown - Low Severity        Discontinued Medications:  Medications Discontinued During This Encounter   Medication Reason    sertraline (Zoloft) 50 MG tablet Reorder           Current Medications:    Current Outpatient Medications   Medication Sig Dispense Refill    sertraline (Zoloft) 100 MG tablet Take 1 tablet by mouth Daily. 30 tablet 2    amphetamine-dextroamphetamine (ADDERALL) 20 MG tablet TAKE 1 TABLET BY MOUTH 2 TIMES DAY 60 tablet 0    hydrOXYzine (ATARAX) 25 MG tablet Take 1 tablet by mouth 3 (Three) Times a Day As Needed for Anxiety. 90 tablet 2    levonorgestrel (KYLEENA) 19.5 MG intrauterine device IUD To be inserted one time by prescriber by Intrauterine route. 1 each 0    propranolol (INDERAL) 10 MG tablet Take 1 tablet by mouth 2 (Two) Times a Day As Needed (anxiety). 60 tablet 1     No current facility-administered medications for this visit.         Psychological ROS: positive for - anxiety, concentration difficulties and depression  negative for - behavioral disorder, decreased libido, disorientation, hallucinations, hostility, irritability, memory difficulties, mood swings, obsessive thoughts, physical abuse, sexual abuse, sleep disturbances or suicidal ideation      Physical Exam:   Blood pressure 130/76, not currently breastfeeding.    Mental Status Exam:   Hygiene:   good  Cooperation:  Cooperative  Eye Contact:  Good  Psychomotor Behavior:  Appropriate  Affect:  Appropriate  Mood: normal  Hopelessness: Denies  Speech:  Normal  Thought Process:  Goal directed and Linear  Thought Content:  Normal  Suicidal:  None  Homicidal:  None  Hallucinations:  None  Delusion:  None  Memory:  Intact  Orientation:  Person, Place, Time and Situation  Reliability:  good  Insight:  Good  Judgement:  Good  Impulse Control:  Good  Physical/Medical Issues:  No      Mental status exam was reviewed and compared to visit on 9/27/23 and appropriate updates were made.     PHQ-9 Depression Screening    Little interest or pleasure in doing things? 1-->several days   Feeling down, depressed, or hopeless? 1-->several days   Trouble falling or staying asleep, or sleeping too much? 3-->nearly every day   Feeling tired  or having little energy? 1-->several days   Poor appetite or overeating? 1-->several days   Feeling bad about yourself - or that you are a failure or have let yourself or your family down? 1-->several days   Trouble concentrating on things, such as reading the newspaper or watching television? 1-->several days   Moving or speaking so slowly that other people could have noticed? Or the opposite - being so fidgety or restless that you have been moving around a lot more than usual? 0-->not at all   Thoughts that you would be better off dead, or of hurting yourself in some way? 0-->not at all   PHQ-9 Total Score 9   If you checked off any problems, how difficult have these problems made it for you to do your work, take care of things at home, or get along with other people? somewhat difficult        Never smoker    I advised Neva of the risks of tobacco use.     Result Review:    Labs:  Office Visit on 11/06/2023   Component Date Value Ref Range Status    Color 11/06/2023 Yellow  Yellow, Straw, Dark Yellow, Jen Final    Clarity, UA 11/06/2023 Cloudy (A)  Clear Final    Specific Gravity  11/06/2023 1.005  1.005 - 1.030 Final    pH, Urine 11/06/2023 5.0  5.0 - 8.0 Final    Leukocytes 11/06/2023 Moderate (2+) (A)  Negative Final    Nitrite, UA 11/06/2023 Positive (A)  Negative Final    Protein, POC 11/06/2023 Negative  Negative mg/dL Final    Glucose, UA 11/06/2023 Negative  Negative mg/dL Final    Ketones, UA 11/06/2023 Negative  Negative Final    Urobilinogen, UA 11/06/2023 Normal  Normal, 0.2 E.U./dL Final    Bilirubin 11/06/2023 Negative  Negative Final    Blood, UA 11/06/2023 Negative  Negative Final    Lot Number 11/06/2023 [id number]   Final    Expiration Date 11/06/2023 08/30/24   Final    Urine Culture 11/06/2023 >100,000 CFU/mL Escherichia coli (A)   Final       Assessment & Plan   Diagnoses and all orders for this visit:    1. Attention deficit disorder (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 hyperactivity (Primary)    2. Generalized  anxiety disorder    Other orders  -     hydrOXYzine (ATARAX) 25 MG tablet; Take 1 tablet by mouth 3 (Three) Times a Day As Needed for Anxiety.  Dispense: 90 tablet; Refill: 2  -     sertraline (Zoloft) 100 MG tablet; Take 1 tablet by mouth Daily.  Dispense: 30 tablet; Refill: 2        Continue sertraline, increase to 100mg daily.   Continue hydroxyzine, increase to 25mg TID.   Stop propranolol.   Continue Adderall 20mg IR, twice daily. Not due for a fill today. Get UDS in future.     Visit Diagnoses:    ICD-10-CM ICD-9-CM   1. Attention deficit disorder (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 hyperactivity  F98.8 314.00   2. Generalized anxiety disorder  F41.1 300.02           TREATMENT PLAN/GOALS: Continue supportive psychotherapy efforts and medications as indicated. Treatment and medication options discussed during today's visit. Patient ackowledged and verbally consented to continue with current treatment plan and was educated on the importance of compliance with treatment and follow-up appointments.    MEDICATION ISSUES:  INSPECT reviewed as expected    UDS 10/12/22 consistent.     Discussed medication options and treatment plan of prescribed medication as well as the risks, benefits, and side effects including potential falls, possible impaired driving and metabolic adversities among others. Patient is agreeable to call the office with any worsening of symptoms or onset of side effects. Patient is agreeable to call 911 or go to the nearest ER should he/she begin having SI/HI. No medication side effects or related complaints today.     MEDS ORDERED DURING VISIT:  New Medications Ordered This Visit   Medications    hydrOXYzine (ATARAX) 25 MG tablet     Sig: Take 1 tablet by mouth 3 (Three) Times a Day As Needed for Anxiety.     Dispense:  90 tablet     Refill:  2    sertraline (Zoloft) 100 MG tablet     Sig: Take 1 tablet by mouth Daily.     Dispense:  30 tablet     Refill:  2       Return in about 8 weeks (around 2/21/2024).          This document has been electronically signed by MAIKEL Short  December 27, 2023 13:46 EST    Part of this note may be an electronic transcription/translation of spoken language to printed text using the Dragon Dictation System.

## 2023-12-27 ENCOUNTER — OFFICE VISIT (OUTPATIENT)
Dept: PSYCHIATRY | Facility: CLINIC | Age: 28
End: 2023-12-27
Payer: COMMERCIAL

## 2023-12-27 VITALS — SYSTOLIC BLOOD PRESSURE: 130 MMHG | DIASTOLIC BLOOD PRESSURE: 76 MMHG

## 2023-12-27 DIAGNOSIS — F41.1 GENERALIZED ANXIETY DISORDER: Chronic | ICD-10-CM

## 2023-12-27 DIAGNOSIS — F98.8 ATTENTION DEFICIT DISORDER (ADD) WITHOUT HYPERACTIVITY: Primary | Chronic | ICD-10-CM

## 2023-12-27 RX ORDER — SERTRALINE HYDROCHLORIDE 100 MG/1
100 TABLET, FILM COATED ORAL DAILY
Qty: 30 TABLET | Refills: 2 | Status: SHIPPED | OUTPATIENT
Start: 2023-12-27 | End: 2024-12-26

## 2023-12-27 RX ORDER — HYDROXYZINE HYDROCHLORIDE 25 MG/1
25 TABLET, FILM COATED ORAL 3 TIMES DAILY PRN
Qty: 90 TABLET | Refills: 2 | Status: SHIPPED | OUTPATIENT
Start: 2023-12-27

## 2024-01-25 ENCOUNTER — OFFICE VISIT (OUTPATIENT)
Dept: FAMILY MEDICINE CLINIC | Facility: CLINIC | Age: 29
End: 2024-01-25
Payer: COMMERCIAL

## 2024-01-25 VITALS
DIASTOLIC BLOOD PRESSURE: 76 MMHG | SYSTOLIC BLOOD PRESSURE: 112 MMHG | HEIGHT: 69 IN | BODY MASS INDEX: 27.46 KG/M2 | HEART RATE: 61 BPM | RESPIRATION RATE: 16 BRPM | TEMPERATURE: 96.9 F | WEIGHT: 185.4 LBS | OXYGEN SATURATION: 97 %

## 2024-01-25 DIAGNOSIS — M54.50 CHRONIC BILATERAL LOW BACK PAIN WITHOUT SCIATICA: Primary | ICD-10-CM

## 2024-01-25 DIAGNOSIS — G89.29 CHRONIC BILATERAL LOW BACK PAIN WITHOUT SCIATICA: Primary | ICD-10-CM

## 2024-01-25 DIAGNOSIS — F41.1 GAD (GENERALIZED ANXIETY DISORDER): Chronic | ICD-10-CM

## 2024-01-25 PROCEDURE — 99213 OFFICE O/P EST LOW 20 MIN: CPT | Performed by: FAMILY MEDICINE

## 2024-01-25 NOTE — PROGRESS NOTES
Subjective   Chief Complaint   Patient presents with    Med Refill    Anxiety    Back Pain     Neva Whitaker is a 28 y.o. female.     Patient Care Team:  Chika Marie MD as PCP - General (Family Medicine)  Jessica Holbrook MD as Consulting Physician (Obstetrics and Gynecology)    History of Present Illness  She is coming in today to follow-up on her anxiety, she has been on sertraline 100 mg for several months now and reports that medication is certainly making a huge difference and helps control her anxiety on daily basis.  She also takes hydroxyzine as needed.  She is followed by the local psychiatry NPP for the management of ADD and she is on Adderall.  She has dealt with anxiety for pretty much all of her life.  She tells me that she in general worries a lot about her physical health.  She has been worried in particular about possibly having a brain tumor for the last 4 years since she found out about the low child and local school being diagnosed with a brain tumor, she denies any headaches, visual problems, nausea, or vomiting.  She also wants to talk about her chronic back issues which she has dealt with for many years.  The pain is located in the back and does not radiate, it gets worse with certain positions and movement.  It affects her daily routine and also sleep.  She stays very active and exercises on regular basis and noted some back pain even at that time.  She was previously advised to get x-ray, but she tells me that she never got it due to concern about expense.       The following portions of the patient's history were reviewed and updated as appropriate: allergies, current medications, past family history, past medical history, past social history, past surgical history, and problem list.  Past Medical History:   Diagnosis Date    ADHD (attention deficit hyperactivity disorder)     Anxiety     Chlamydia 2017     Past Surgical History:   Procedure Laterality Date    BREAST AUGMENTATION    "   TONSILLECTOMY       The patient has a family history of  Family History   Problem Relation Age of Onset    Heart failure Paternal Grandfather     Colon cancer Maternal Grandmother         76    Breast cancer Neg Hx     Ovarian cancer Neg Hx     Uterine cancer Neg Hx     Deep vein thrombosis Neg Hx     Pulmonary embolism Neg Hx      Social History     Socioeconomic History    Marital status: Single   Tobacco Use    Smoking status: Never    Smokeless tobacco: Never   Vaping Use    Vaping Use: Never used   Substance and Sexual Activity    Alcohol use: Not Currently     Comment: OCC    Drug use: No    Sexual activity: Yes     Partners: Male     Birth control/protection: I.U.D.     Comment: Hortensia 12/2017       Review of Systems   Constitutional:  Negative for activity change, appetite change and fatigue.   Gastrointestinal:  Negative for diarrhea, nausea and vomiting.   Musculoskeletal:  Positive for back pain. Negative for arthralgias.   Skin:  Negative for dry skin, rash and skin lesions.   Psychiatric/Behavioral:  Negative for agitation, behavioral problems, decreased concentration, dysphoric mood, hallucinations, self-injury, sleep disturbance, suicidal ideas, negative for hyperactivity, depressed mood and stress. The patient is nervous/anxious.      Visit Vitals  /76 (BP Location: Left arm, Patient Position: Sitting, Cuff Size: Adult)   Pulse 61   Temp 96.9 °F (36.1 °C) (Infrared)   Resp 16   Ht 175.3 cm (69.02\")   Wt 84.1 kg (185 lb 6.4 oz)   SpO2 97%   BMI 27.37 kg/m²              Current Outpatient Medications:     amphetamine-dextroamphetamine (ADDERALL) 20 MG tablet, TAKE 1 TABLET BY MOUTH 2 TIMES DAY, Disp: 60 tablet, Rfl: 0    hydrOXYzine (ATARAX) 25 MG tablet, Take 1 tablet by mouth 3 (Three) Times a Day As Needed for Anxiety., Disp: 90 tablet, Rfl: 2    levonorgestrel (KYLEENA) 19.5 MG intrauterine device IUD, To be inserted one time by prescriber by Intrauterine route., Disp: 1 each, Rfl: 0    " sertraline (Zoloft) 100 MG tablet, Take 1 tablet by mouth Daily., Disp: 30 tablet, Rfl: 2    Objective   Physical Exam  Constitutional:       General: She is not in acute distress.     Appearance: Normal appearance. She is well-developed. She is not ill-appearing or diaphoretic.      Comments: Patient is in no distress, patient has normal voice and speech.  Normal respiratory effort.   HENT:      Head: Normocephalic and atraumatic.   Pulmonary:      Effort: Pulmonary effort is normal.   Musculoskeletal:         General: No swelling, tenderness or deformity. Normal range of motion.      Cervical back: Normal range of motion and neck supple.   Neurological:      General: No focal deficit present.      Mental Status: She is alert and oriented to person, place, and time. Mental status is at baseline.   Psychiatric:         Mood and Affect: Mood normal.         Behavior: Behavior normal.         Thought Content: Thought content normal.      Comments: She is pleasant, cooperative, she has good eye contact.         Assessment & Plan   Diagnoses and all orders for this visit:    1. Chronic bilateral low back pain without sciatica (Primary)  -     XR Spine Lumbar 2 or 3 View; Future  -     Ambulatory Referral to Physical Therapy Evaluate and treat    2. COLTON (generalized anxiety disorder)      Her anxiety is much better controlled now with sertraline, she may continue the same medication and she may also continue hydroxyzine.  She still has some residual anxiety and worries a lot about her health.  She has overall very healthy lifestyle and works out on regular basis.  We also addressed her chronic back problems, I will be getting x-ray of the lumbar spine and I will be referring her to physical therapy.  She may take over-the-counter anti-inflammatories like ibuprofen as needed for pain.      Return in about 6 months (around 7/25/2024) for Next scheduled follow up.    Requested Prescriptions      No prescriptions requested  or ordered in this encounter

## 2024-03-11 DIAGNOSIS — F98.8 ATTENTION DEFICIT DISORDER (ADD) WITHOUT HYPERACTIVITY: Chronic | ICD-10-CM

## 2024-03-11 RX ORDER — DEXTROAMPHETAMINE SACCHARATE, AMPHETAMINE ASPARTATE, DEXTROAMPHETAMINE SULFATE AND AMPHETAMINE SULFATE 5; 5; 5; 5 MG/1; MG/1; MG/1; MG/1
20 TABLET ORAL 2 TIMES DAILY
Qty: 60 TABLET | Refills: 0 | Status: SHIPPED | OUTPATIENT
Start: 2024-03-11

## 2024-03-11 NOTE — TELEPHONE ENCOUNTER
Rx Refill Note  Requested Prescriptions     Pending Prescriptions Disp Refills    amphetamine-dextroamphetamine (ADDERALL) 20 MG tablet 60 tablet 0        Last office visit with prescribing clinician: 12/27/2023     Next office visit with prescribing clinician: 5/13/2024     Office Visit with Tyesha Frank APRN (12/27/2023)     SCANNED - LABS (10/12/2022)     Inspect Fill 12/14/23    Barbara Zee  03/11/24, 13:24 EDT

## 2024-04-12 DIAGNOSIS — F98.8 ATTENTION DEFICIT DISORDER (ADD) WITHOUT HYPERACTIVITY: Chronic | ICD-10-CM

## 2024-04-12 RX ORDER — DEXTROAMPHETAMINE SACCHARATE, AMPHETAMINE ASPARTATE, DEXTROAMPHETAMINE SULFATE AND AMPHETAMINE SULFATE 5; 5; 5; 5 MG/1; MG/1; MG/1; MG/1
20 TABLET ORAL 2 TIMES DAILY
Qty: 60 TABLET | Refills: 0 | Status: SHIPPED | OUTPATIENT
Start: 2024-04-12

## 2024-04-12 NOTE — TELEPHONE ENCOUNTER
Rx Refill Note  Requested Prescriptions     Pending Prescriptions Disp Refills    amphetamine-dextroamphetamine (ADDERALL) 20 MG tablet 60 tablet 0     Sig: Take 1 tablet by mouth 2 (Two) Times a Day.        Last office visit with prescribing clinician: 12/27/2023     Next office visit with prescribing clinician: 5/13/2024     Office Visit with Tyesha Frank APRN (12/27/2023)     SCANNED - LABS (10/12/2022)     Inspect Fill 3/11/24    Barbara Zee  04/12/24, 12:05 EDT     Patient called stating she is out and has a big test monday

## 2024-06-12 DIAGNOSIS — F98.8 ATTENTION DEFICIT DISORDER (ADD) WITHOUT HYPERACTIVITY: Chronic | ICD-10-CM

## 2024-06-12 RX ORDER — DEXTROAMPHETAMINE SACCHARATE, AMPHETAMINE ASPARTATE, DEXTROAMPHETAMINE SULFATE AND AMPHETAMINE SULFATE 5; 5; 5; 5 MG/1; MG/1; MG/1; MG/1
20 TABLET ORAL 2 TIMES DAILY
Qty: 60 TABLET | Refills: 0 | Status: SHIPPED | OUTPATIENT
Start: 2024-06-12

## 2024-06-12 NOTE — TELEPHONE ENCOUNTER
Rx Refill Note  Requested Prescriptions     Pending Prescriptions Disp Refills    amphetamine-dextroamphetamine (ADDERALL) 20 MG tablet 60 tablet 0     Sig: Take 1 tablet by mouth 2 (Two) Times a Day.        Last office visit with prescribing clinician: 12/27/2023     Next office visit with prescribing clinician: Visit date not found     Office Visit with Tyesha Frank APRN (12/27/2023)     SCANNED - LABS (10/12/2022)     Inspect Fill 4/13/24    Barbara Zee  06/12/24, 08:54 EDT     She now has [place] Medicaid and can no longer come to our office. She is finding a new psychiatry in [place] to fill her medications.  This should be her last fill with us, unless they schedule her way out.

## 2024-06-14 ENCOUNTER — PRIOR AUTHORIZATION (OUTPATIENT)
Dept: PSYCHIATRY | Facility: CLINIC | Age: 29
End: 2024-06-14

## 2024-06-14 NOTE — TELEPHONE ENCOUNTER
PA done for Adderall 20 mg IR BID for her new insurance. On cover my meds for [facility] Medicaid.

## 2024-07-31 ENCOUNTER — OFFICE VISIT (OUTPATIENT)
Dept: FAMILY MEDICINE CLINIC | Facility: CLINIC | Age: 29
End: 2024-07-31
Payer: MEDICAID

## 2024-07-31 VITALS
BODY MASS INDEX: 29.4 KG/M2 | WEIGHT: 198.5 LBS | OXYGEN SATURATION: 100 % | HEART RATE: 61 BPM | SYSTOLIC BLOOD PRESSURE: 110 MMHG | DIASTOLIC BLOOD PRESSURE: 72 MMHG | RESPIRATION RATE: 16 BRPM | HEIGHT: 69 IN

## 2024-07-31 DIAGNOSIS — Z11.1 SCREENING-PULMONARY TB: ICD-10-CM

## 2024-07-31 DIAGNOSIS — G89.29 CHRONIC BILATERAL LOW BACK PAIN WITHOUT SCIATICA: Primary | ICD-10-CM

## 2024-07-31 DIAGNOSIS — Z02.1 PRE-EMPLOYMENT HEALTH SCREENING EXAMINATION: ICD-10-CM

## 2024-07-31 DIAGNOSIS — F43.23 ADJUSTMENT DISORDER WITH MIXED ANXIETY AND DEPRESSED MOOD: ICD-10-CM

## 2024-07-31 DIAGNOSIS — M54.50 CHRONIC BILATERAL LOW BACK PAIN WITHOUT SCIATICA: Primary | ICD-10-CM

## 2024-07-31 PROCEDURE — 99214 OFFICE O/P EST MOD 30 MIN: CPT | Performed by: FAMILY MEDICINE

## 2024-07-31 NOTE — ASSESSMENT & PLAN NOTE
Immunizations reviewed, up-to-date with exception of COVID, which patient declines.  Despite low back pain, patient reports no mobility limitations.  Executive functioning symptoms managed on Adderall.    TB test ordered, will complete paperwork once resulted.

## 2024-07-31 NOTE — ASSESSMENT & PLAN NOTE
biLateral SI joint pain, paraspinal muscle tenderness in lumbar and sacral areas, no overt midline tenderness.    Will obtain plain films as previously ordered given duration of pain.    Recommend core strengthening.    Suspect patient will benefit from physical therapy.

## 2024-07-31 NOTE — ASSESSMENT & PLAN NOTE
Patient is currently under the care of of psychiatry and taking Zoloft and Adderall.  Patient reports situational depression related to living at home and challenges in relationship with mother with passive suicidal ideation.  No history of self-harm.  No intent for self-harm.

## 2024-07-31 NOTE — PROGRESS NOTES
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  Overall, she is doing fairly well.  She is having some mood disturbances related to life stressors at this point.  She is under care of by specialists.  She is also having some continued chronic low back pain.  She states that low back pain improves when she is in competition shape for bodybuilding, but it is not sustainable to remain at that level of body fat for long periods.  She is not currently doing much in the line of core strengthening or compound lifting.      Objective   Vital Signs:  /72   Pulse 61   Resp 16   Ht 175.3 cm (69.02\")   Wt 90 kg (198 lb 8 oz)   SpO2 100%   BMI 29.30 kg/m²   Estimated body mass index is 29.3 kg/m² as calculated from the following:    Height as of this encounter: 175.3 cm (69.02\").    Weight as of this encounter: 90 kg (198 lb 8 oz).               Physical Exam  Constitutional:       Appearance: Normal appearance.   HENT:      Head: Normocephalic and atraumatic.      Nose: Nose normal.      Mouth/Throat:      Mouth: Mucous membranes are moist.   Eyes:      General: Lids are normal.      Conjunctiva/sclera: Conjunctivae normal.   Cardiovascular:      Rate and Rhythm: Normal rate and regular rhythm.      Heart sounds: Normal heart sounds.   Pulmonary:      Effort: Pulmonary effort is normal.      Breath sounds: Normal breath sounds.   Musculoskeletal:      Cervical back: Normal range of motion.      Comments: Bilateral sacroiliac joint tenderness, bilateral lumbar paraspinal tenderness   Skin:     General: Skin is warm and dry.   Neurological:      General: No focal deficit present.      Mental Status: She is alert and oriented to person, place, and time.      Gait: Gait " is intact.   Psychiatric:         Mood and Affect: Mood normal.         Behavior: Behavior normal.         Thought Content: Thought content normal.        Result Review :                     Assessment and Plan     Diagnoses and all orders for this visit:    1. Chronic bilateral low back pain without sciatica (Primary)  Assessment & Plan:  biLateral SI joint pain, paraspinal muscle tenderness in lumbar and sacral areas, no overt midline tenderness.    Will obtain plain films as previously ordered given duration of pain.    Recommend core strengthening.    Suspect patient will benefit from physical therapy.    Orders:  -     XR Spine Lumbar 2 or 3 View; Future  -     XR Sacroiliac Joints 3+ View; Future    2. Pre-employment health screening examination  Assessment & Plan:  Immunizations reviewed, up-to-date with exception of COVID, which patient declines.  Despite low back pain, patient reports no mobility limitations.  Executive functioning symptoms managed on Adderall.    TB test ordered, will complete paperwork once resulted.    Orders:  -     QuantiFERON TB Gold; Future    3. Screening-pulmonary TB    4. Adjustment disorder with mixed anxiety and depressed mood  Assessment & Plan:  Patient is currently under the care of of psychiatry and taking Zoloft and Adderall.  Patient reports situational depression related to living at home and challenges in relationship with mother with passive suicidal ideation.  No history of self-harm.  No intent for self-harm.             I spent 36 minutes caring for Neva on this date of service. This time includes time spent by me in the following activities:preparing for the visit, reviewing tests, obtaining and/or reviewing a separately obtained history, performing a medically appropriate examination and/or evaluation , counseling and educating the patient/family/caregiver, ordering medications, tests, or procedures, and documenting information in the medical record  Follow Up      No follow-ups on file.  Patient was given instructions and counseling regarding her condition or for health maintenance advice. Please see specific information pulled into the AVS if appropriate.

## 2024-08-07 DIAGNOSIS — M54.9 DORSALGIA: Primary | ICD-10-CM

## 2024-08-13 ENCOUNTER — TELEPHONE (OUTPATIENT)
Dept: FAMILY MEDICINE CLINIC | Facility: CLINIC | Age: 29
End: 2024-08-13

## 2024-08-13 NOTE — TELEPHONE ENCOUNTER
Caller: Neva Whitaker    Relationship: Self    Best call back number: [phone]     What form or medical record are you requesting: PAPERWORK FOR SCHOOL    How would you like to receive the form or medical records (pick-up, mail, fax): FAX  If fax, what is the fax number: [phone]    Additional notes: PATIENT STATES SHE DROPPED OF PAPERWORK NEEDED FOR HER SCHOOL, AND NEEDS TO HAVE THAT FAXED WHEN COMPLETED

## 2024-09-03 DIAGNOSIS — F98.8 ATTENTION DEFICIT DISORDER (ADD) WITHOUT HYPERACTIVITY: Chronic | ICD-10-CM

## 2024-09-03 RX ORDER — DEXTROAMPHETAMINE SACCHARATE, AMPHETAMINE ASPARTATE, DEXTROAMPHETAMINE SULFATE AND AMPHETAMINE SULFATE 5; 5; 5; 5 MG/1; MG/1; MG/1; MG/1
20 TABLET ORAL 2 TIMES DAILY
Qty: 60 TABLET | Refills: 0 | Status: SHIPPED | OUTPATIENT
Start: 2024-09-03

## 2024-09-03 NOTE — TELEPHONE ENCOUNTER
Caller: Neva Whitaker    Relationship: Self    Best call back number: [phone]     Requested Prescriptions:   Requested Prescriptions     Pending Prescriptions Disp Refills    amphetamine-dextroamphetamine (ADDERALL) 20 MG tablet 60 tablet 0     Sig: Take 1 tablet by mouth 2 (Two) Times a Day.        Pharmacy where request should be sent: [facility] [phone] [place] [phone]      Last office visit with prescribing clinician: 7/31/2024   Last telemedicine visit with prescribing clinician: Visit date not found   Next office visit with prescribing clinician: Visit date not found     Additional details provided by patient: PATIENT HAS TWO PILLS LEFT AND IS IN PROCESS OF CHANGING MENTAL HEALTH PROVIDER. SHE NEEDS A REFILL.    Does the patient have less than a 3 day supply:  [x] Yes  [] No    Would you like a call back once the refill request has been completed: [x] Yes [] No    If the office needs to give you a call back, can they leave a voicemail: [x] Yes [] No    Shaylee Goldsmith Rep   09/03/24 15:14 EDT

## 2024-09-03 NOTE — TELEPHONE ENCOUNTER
Rx Refill Note  Requested Prescriptions     Pending Prescriptions Disp Refills    amphetamine-dextroamphetamine (ADDERALL) 20 MG tablet 60 tablet 0     Sig: Take 1 tablet by mouth 2 (Two) Times a Day.      Last office visit with prescribing clinician: 7/31/2024   Next office visit with prescribing clinician: Visit date not found     Elmo Lund MA  09/03/24, 15:22 EDT

## 2024-09-05 ENCOUNTER — TELEPHONE (OUTPATIENT)
Dept: FAMILY MEDICINE CLINIC | Facility: CLINIC | Age: 29
End: 2024-09-05

## 2024-09-05 DIAGNOSIS — F98.8 ATTENTION DEFICIT DISORDER (ADD) WITHOUT HYPERACTIVITY: Chronic | ICD-10-CM

## 2024-09-05 NOTE — TELEPHONE ENCOUNTER
Caller: Neva Whitaker    Relationship: Self    Best call back number:     Neva Whitaker (Self) [phone] (Mobile)       What was the call regarding: PATIENT IS WANTING TO KNOW IF YOU WILL CONTINUE TO FILL HER ADDERALL OR DOES SHE NEED TO FIND A BEHAVIORAL HEALTH PROVIDER?         Is it okay if the provider responds through MyChart: CALL/ CAN LEAVE A MESSAGE

## 2024-09-09 ENCOUNTER — OFFICE VISIT (OUTPATIENT)
Dept: FAMILY MEDICINE CLINIC | Facility: CLINIC | Age: 29
End: 2024-09-09

## 2024-09-09 VITALS
TEMPERATURE: 97 F | SYSTOLIC BLOOD PRESSURE: 116 MMHG | BODY MASS INDEX: 28.64 KG/M2 | OXYGEN SATURATION: 97 % | DIASTOLIC BLOOD PRESSURE: 74 MMHG | HEIGHT: 69 IN | HEART RATE: 48 BPM | RESPIRATION RATE: 16 BRPM | WEIGHT: 193.4 LBS

## 2024-09-09 DIAGNOSIS — Z79.899 CONTROLLED SUBSTANCE AGREEMENT SIGNED: Primary | ICD-10-CM

## 2024-09-09 DIAGNOSIS — F41.1 GAD (GENERALIZED ANXIETY DISORDER): Chronic | ICD-10-CM

## 2024-09-09 DIAGNOSIS — F98.8 ATTENTION DEFICIT DISORDER (ADD) WITHOUT HYPERACTIVITY: Chronic | ICD-10-CM

## 2024-09-09 PROCEDURE — 99214 OFFICE O/P EST MOD 30 MIN: CPT | Performed by: FAMILY MEDICINE

## 2024-09-09 RX ORDER — SERTRALINE HYDROCHLORIDE 100 MG/1
100 TABLET, FILM COATED ORAL DAILY
Qty: 90 TABLET | Refills: 3 | Status: SHIPPED | OUTPATIENT
Start: 2024-09-09 | End: 2025-09-04

## 2024-09-10 LAB
AMPHETAMINES UR QL SCN: NEGATIVE NG/ML
BARBITURATES UR QL SCN: NEGATIVE NG/ML
BENZODIAZ UR QL SCN: NEGATIVE NG/ML
BZE UR QL SCN: NEGATIVE NG/ML
CANNABINOIDS UR QL SCN: NEGATIVE NG/ML
CREAT UR-MCNC: 41.2 MG/DL (ref 20–300)
LABORATORY COMMENT REPORT: NORMAL
METHADONE UR QL SCN: NEGATIVE NG/ML
OPIATES UR QL SCN: NEGATIVE NG/ML
OXYCODONE+OXYMORPHONE UR QL SCN: NEGATIVE NG/ML
PCP UR QL: NEGATIVE NG/ML
PH UR: 5.9 [PH] (ref 4.5–8.9)
PROPOXYPH UR QL SCN: NEGATIVE NG/ML

## 2024-09-16 PROBLEM — Z79.899 CONTROLLED SUBSTANCE AGREEMENT SIGNED: Status: ACTIVE | Noted: 2024-09-16

## 2024-09-27 NOTE — TELEPHONE ENCOUNTER
Caller: Neva Whitaker    Relationship: Self    Best call back number:     [phone] (Mobile)       Requested Prescriptions:   Requested Prescriptions     Pending Prescriptions Disp Refills    amphetamine-dextroamphetamine (ADDERALL) 20 MG tablet 60 tablet 0     Sig: Take 1 tablet by mouth 2 (Two) Times a Day.        [id number] [place] [address] - [phone]  - [phone] FX     Last office visit with prescribing clinician: 7/31/2024   Last telemedicine visit with prescribing clinician: Visit date not found   Next office visit with prescribing clinician: 9/9/2024     Additional details provided by patient: PATIENT HAS TWO DAYS     Does the patient have less than a 3 day supply:  [x] Yes  [] No    Would you like a call back once the refill request has been completed: [] Yes [x] No    If the office needs to give you a call back, can they leave a voicemail: [] Yes [x] No    Shaylee Driscoll Rep   09/27/24 13:56 EDT

## 2024-09-30 DIAGNOSIS — F98.8 ATTENTION DEFICIT DISORDER (ADD) WITHOUT HYPERACTIVITY: Chronic | ICD-10-CM

## 2024-09-30 RX ORDER — DEXTROAMPHETAMINE SACCHARATE, AMPHETAMINE ASPARTATE, DEXTROAMPHETAMINE SULFATE AND AMPHETAMINE SULFATE 5; 5; 5; 5 MG/1; MG/1; MG/1; MG/1
20 TABLET ORAL 2 TIMES DAILY
Qty: 60 TABLET | Refills: 0 | Status: SHIPPED | OUTPATIENT
Start: 2024-09-30

## 2024-09-30 NOTE — TELEPHONE ENCOUNTER
Caller: Neva Whitaker    Relationship: Self    Best call back number: [phone]    Requested Prescriptions:   Requested Prescriptions     Pending Prescriptions Disp Refills    amphetamine-dextroamphetamine (ADDERALL) 20 MG tablet 60 tablet 0     Sig: Take 1 tablet by mouth 2 (Two) Times a Day.        Pharmacy where request should be sent: [facility]/PHARMACY #[id number] - [place] - [address] [facility] [address] - [phone] PH - [phone] FX     Last office visit with prescribing clinician: 9/9/2024   Last telemedicine visit with prescribing clinician: Visit date not found   Next office visit with prescribing clinician: Visit date not found     Additional details provided by patient: PATIENT WILL BE OUT OF THIS MEDICATION AFTER TODAY.     Does the patient have less than a 3 day supply:  [x] Yes  [] No    Would you like a call back once the refill request has been completed: [] Yes [x] No    If the office needs to give you a call back, can they leave a voicemail: [] Yes [x] No    Shaylee Valencia Rep   09/30/24 09:02 EDT

## 2024-10-01 ENCOUNTER — PATIENT MESSAGE (OUTPATIENT)
Dept: FAMILY MEDICINE CLINIC | Facility: CLINIC | Age: 29
End: 2024-10-01

## 2024-10-03 RX ORDER — DEXTROAMPHETAMINE SACCHARATE, AMPHETAMINE ASPARTATE, DEXTROAMPHETAMINE SULFATE AND AMPHETAMINE SULFATE 5; 5; 5; 5 MG/1; MG/1; MG/1; MG/1
20 TABLET ORAL 2 TIMES DAILY
Qty: 60 TABLET | Refills: 0 | Status: SHIPPED | OUTPATIENT
Start: 2024-10-03

## 2024-10-10 ENCOUNTER — TELEPHONE (OUTPATIENT)
Dept: FAMILY MEDICINE CLINIC | Facility: CLINIC | Age: 29
End: 2024-10-10

## 2024-10-10 NOTE — TELEPHONE ENCOUNTER
Caller: Neva Whitaker    Relationship: Self    Best call back number: [phone]    What form or medical record are you requesting: 'S NOTE STATING THAT PATIENT IS PRESCRIBED ADDERALL FOR MEDICAL PURPOSES PRIOR TO BEGINNING HER CLINICALS    Who is requesting this form or medical record from you: KENISHA STONE - CLINICAL SUPERVISOR    How would you like to receive the form or medical records (pick-up, mail, fax): FAX  If fax, what is the fax number: [phone]    Additional notes:

## 2024-10-21 ENCOUNTER — TELEPHONE (OUTPATIENT)
Dept: FAMILY MEDICINE CLINIC | Facility: CLINIC | Age: 29
End: 2024-10-21

## 2024-10-21 RX ORDER — SERTRALINE HYDROCHLORIDE 100 MG/1
100 TABLET, FILM COATED ORAL DAILY
Qty: 90 TABLET | Refills: 3 | Status: SHIPPED | OUTPATIENT
Start: 2024-10-21 | End: 2025-10-16

## 2024-10-21 NOTE — TELEPHONE ENCOUNTER
Caller: KENISHA STONE    Relationship: Significant Other    Best call back number: [phone]    What does billing need from the patient: PATIENT NEEDS BILLING RESUBMITTED TO INSURANCE FOR APPS 7/31/24 AND 9/9/24 AS INSURANCE HAS BEEN BACKDATED TO COVER THOSE DATES.

## 2024-10-21 NOTE — TELEPHONE ENCOUNTER
Caller: Neva Whitaker    Relationship: Self    Best call back number: [phone]     Requested Prescriptions:   Requested Prescriptions     Pending Prescriptions Disp Refills    sertraline (Zoloft) 100 MG tablet 90 tablet 3     Sig: Take 1 tablet by mouth Daily for 360 days.        Pharmacy where request should be sent: [facility]/PHARMACY #[id number] - [place], [place] - [address] [place] [address] [phone] [facility] [phone]      Last office visit with prescribing clinician: 9/9/2024   Last telemedicine visit with prescribing clinician: Visit date not found   Next office visit with prescribing clinician: Visit date not found     Additional details provided by patient:     Does the patient have less than a 3 day supply:  [x] Yes  [] No    Would you like a call back once the refill request has been completed: [] Yes [] No    If the office needs to give you a call back, can they leave a voicemail: [] Yes [] No    Shaylee Alvarez Rep   10/21/24 12:29 EDT

## 2024-10-28 DIAGNOSIS — F98.8 ATTENTION DEFICIT DISORDER (ADD) WITHOUT HYPERACTIVITY: Chronic | ICD-10-CM

## 2024-10-28 RX ORDER — DEXTROAMPHETAMINE SACCHARATE, AMPHETAMINE ASPARTATE, DEXTROAMPHETAMINE SULFATE AND AMPHETAMINE SULFATE 5; 5; 5; 5 MG/1; MG/1; MG/1; MG/1
20 TABLET ORAL 2 TIMES DAILY
Qty: 60 TABLET | Refills: 0 | Status: SHIPPED | OUTPATIENT
Start: 2024-10-28

## 2024-10-28 NOTE — TELEPHONE ENCOUNTER
Caller: Neva Whitaker    Relationship: Self    Best call back number: [phone]     Requested Prescriptions:   Requested Prescriptions     Pending Prescriptions Disp Refills    amphetamine-dextroamphetamine (ADDERALL) 20 MG tablet 60 tablet 0     Sig: Take 1 tablet by mouth 2 (Two) Times a Day.        [facility] [id number] [place] [address] - [phone] PH - [phone] FX     Last office visit with prescribing clinician: 9/9/2024   Last telemedicine visit with prescribing clinician: Visit date not found   Next office visit with prescribing clinician: Visit date not found     Additional details provided by patient: PATIENT STATES THAT SHE HAS 4 TABLETS REMAINING    Does the patient have less than a 3 day supply:  [] Yes  [x] No    Would you like a call back once the refill request has been completed: [] Yes [x] No    If the office needs to give you a call back, can they leave a voicemail: [] Yes [x] No    Shaylee Schuster Rep   10/28/24 08:37 EDT

## 2024-11-25 DIAGNOSIS — F98.8 ATTENTION DEFICIT DISORDER (ADD) WITHOUT HYPERACTIVITY: Chronic | ICD-10-CM

## 2024-11-25 RX ORDER — DEXTROAMPHETAMINE SACCHARATE, AMPHETAMINE ASPARTATE, DEXTROAMPHETAMINE SULFATE AND AMPHETAMINE SULFATE 5; 5; 5; 5 MG/1; MG/1; MG/1; MG/1
20 TABLET ORAL 2 TIMES DAILY
Qty: 60 TABLET | Refills: 0 | Status: SHIPPED | OUTPATIENT
Start: 2024-11-25

## 2024-11-25 NOTE — TELEPHONE ENCOUNTER
Rx Refill Note  Requested Prescriptions     Pending Prescriptions Disp Refills    amphetamine-dextroamphetamine (ADDERALL) 20 MG tablet 60 tablet 0     Sig: Take 1 tablet by mouth 2 (Two) Times a Day.      Last office visit with prescribing clinician: 9/9/2024   Last telemedicine visit with prescribing clinician: Visit date not found   Next office visit with prescribing clinician: Visit date not found     Daaynna Shahid  11/25/24, 08:21 EST

## 2024-12-26 DIAGNOSIS — F98.8 ATTENTION DEFICIT DISORDER (ADD) WITHOUT HYPERACTIVITY: Chronic | ICD-10-CM

## 2024-12-26 RX ORDER — DEXTROAMPHETAMINE SACCHARATE, AMPHETAMINE ASPARTATE, DEXTROAMPHETAMINE SULFATE AND AMPHETAMINE SULFATE 5; 5; 5; 5 MG/1; MG/1; MG/1; MG/1
20 TABLET ORAL 2 TIMES DAILY
Qty: 60 TABLET | Refills: 0 | Status: SHIPPED | OUTPATIENT
Start: 2024-12-26

## 2024-12-26 NOTE — TELEPHONE ENCOUNTER
Rx Refill Note  Requested Prescriptions     Pending Prescriptions Disp Refills    amphetamine-dextroamphetamine (ADDERALL) 20 MG tablet 60 tablet 0     Sig: Take 1 tablet by mouth 2 (Two) Times a Day.      Last office visit with prescribing clinician: 9/9/2024   Next office visit with prescribing clinician: Visit date not found     Elmo Lund MA  12/26/24, 09:09 EST

## 2024-12-26 NOTE — TELEPHONE ENCOUNTER
Caller: Neva Whitaker    Relationship: Self    Best call back number: [phone]     Requested Prescriptions:   Requested Prescriptions     Pending Prescriptions Disp Refills    amphetamine-dextroamphetamine (ADDERALL) 20 MG tablet 60 tablet 0     Sig: Take 1 tablet by mouth 2 (Two) Times a Day.        [facility] [id number] [place] [address] - [phone] PH - [phone] FX     Last office visit with prescribing clinician: 9/9/2024   Last telemedicine visit with prescribing clinician: Visit date not found   Next office visit with prescribing clinician: Visit date not found     Does the patient have less than a 3 day supply:  [x] Yes  [] No    Would you like a call back once the refill request has been completed: [] Yes [x] No    If the office needs to give you a call back, can they leave a voicemail: [] Yes [x] No    Shaylee Schuster Rep   12/26/24 09:00 EST

## 2025-01-28 ENCOUNTER — TELEPHONE (OUTPATIENT)
Dept: FAMILY MEDICINE CLINIC | Facility: CLINIC | Age: 30
End: 2025-01-28

## 2025-01-28 DIAGNOSIS — F98.8 ATTENTION DEFICIT DISORDER (ADD) WITHOUT HYPERACTIVITY: Chronic | ICD-10-CM

## 2025-01-28 RX ORDER — DEXTROAMPHETAMINE SACCHARATE, AMPHETAMINE ASPARTATE, DEXTROAMPHETAMINE SULFATE AND AMPHETAMINE SULFATE 5; 5; 5; 5 MG/1; MG/1; MG/1; MG/1
20 TABLET ORAL 2 TIMES DAILY
Qty: 60 TABLET | Refills: 0 | OUTPATIENT
Start: 2025-01-28

## 2025-01-28 NOTE — TELEPHONE ENCOUNTER
Caller: Neva Whitaker    Relationship: Self    Best call back number: [phone]      What was the call regarding: PATIENT CALLING WANTED TO SEE IF SHE CAN HALF THE DOSE OF THE   sertraline (Zoloft) 100 MG tablet ?    PLEASE GIVE CALLBACK

## 2025-01-28 NOTE — TELEPHONE ENCOUNTER
Caller: Neva Whitaker    Relationship: Self    Best call back number: [phone]    Requested Prescriptions:   Requested Prescriptions     Pending Prescriptions Disp Refills    amphetamine-dextroamphetamine (ADDERALL) 20 MG tablet 60 tablet 0     Sig: Take 1 tablet by mouth 2 (Two) Times a Day.        [id number] [place] [address] - [phone] PH - [phone] FX     Last office visit with prescribing clinician: 9/9/2024   Last telemedicine visit with prescribing clinician: Visit date not found   Next office visit with prescribing clinician: Visit date not found     Additional details provided by patient: PATIENT NEEDING REFILL     Does the patient have less than a 3 day supply:  [x] Yes  [] No    Would you like a call back once the refill request has been completed: [] Yes [x] No    If the office needs to give you a call back, can they leave a voicemail: [] Yes [x] No    Shaylee Veras Rep   01/28/25 13:11 EST

## 2025-02-07 DIAGNOSIS — F98.8 ATTENTION DEFICIT DISORDER (ADD) WITHOUT HYPERACTIVITY: Chronic | ICD-10-CM

## 2025-02-07 RX ORDER — DEXTROAMPHETAMINE SACCHARATE, AMPHETAMINE ASPARTATE, DEXTROAMPHETAMINE SULFATE AND AMPHETAMINE SULFATE 5; 5; 5; 5 MG/1; MG/1; MG/1; MG/1
20 TABLET ORAL 2 TIMES DAILY
Qty: 60 TABLET | Refills: 0 | Status: SHIPPED | OUTPATIENT
Start: 2025-02-07

## 2025-02-07 NOTE — TELEPHONE ENCOUNTER
Caller: Neva Whitaker    Relationship: Self    Best call back number:     Requested Prescriptions:   Requested Prescriptions     Pending Prescriptions Disp Refills    amphetamine-dextroamphetamine (ADDERALL) 20 MG tablet 60 tablet 0     Sig: Take 1 tablet by mouth 2 (Two) Times a Day.        [facility] [id number] [place] [address] - [phone] PH - [phone] FX     Last office visit with prescribing clinician: 9/9/2024   Last telemedicine visit with prescribing clinician: Visit date not found   Next office visit with prescribing clinician: 2/14/2025     Additional details provided by patient:     Does the patient have less than a 3 day supply:  [x] Yes  [] No      Shaylee Garcia Rep   02/07/25 14:20 EST

## 2025-02-14 ENCOUNTER — OFFICE VISIT (OUTPATIENT)
Dept: FAMILY MEDICINE CLINIC | Facility: CLINIC | Age: 30
End: 2025-02-14
Payer: MEDICAID

## 2025-02-14 VITALS
BODY MASS INDEX: 27.19 KG/M2 | HEART RATE: 78 BPM | OXYGEN SATURATION: 98 % | SYSTOLIC BLOOD PRESSURE: 140 MMHG | WEIGHT: 183.6 LBS | DIASTOLIC BLOOD PRESSURE: 72 MMHG | RESPIRATION RATE: 18 BRPM | HEIGHT: 69 IN

## 2025-02-14 DIAGNOSIS — G89.29 CHRONIC BILATERAL LOW BACK PAIN WITHOUT SCIATICA: ICD-10-CM

## 2025-02-14 DIAGNOSIS — M54.50 CHRONIC BILATERAL LOW BACK PAIN WITHOUT SCIATICA: ICD-10-CM

## 2025-02-14 DIAGNOSIS — Z79.899 CONTROLLED SUBSTANCE AGREEMENT SIGNED: ICD-10-CM

## 2025-02-14 DIAGNOSIS — R22.9 SUBCUTANEOUS NODULE: Primary | ICD-10-CM

## 2025-02-14 PROBLEM — L23.7 POISON IVY DERMATITIS: Status: ACTIVE | Noted: 2024-06-05

## 2025-02-14 PROCEDURE — 99213 OFFICE O/P EST LOW 20 MIN: CPT | Performed by: FAMILY MEDICINE

## 2025-02-14 NOTE — PROGRESS NOTES
"Chief Complaint  Back Pain (Chronic back pain)    Subjective        Neva Whitaker presents to [facility]       The patient is a 29-year-old female who presents to the clinic for back pain.    She reports persistent back pain. She has been informed that the pain could be due to a lipoma or a fatty cyst. She describes the presence of a lump near her sacroiliac joint, which is more pronounced on the right side than the left. She also reports tenderness in the area but no spinal pain. She expresses concern about potential kidney issues or cancer, despite the absence of any family history of lymphoma. Her mother has a history of spinal stenosis, spondylolisthesis, and degenerative disc disease, which further heightens her anxiety. She maintains an active lifestyle, working out every Saturday without experiencing pain. However, she avoids back extensions due to the associated discomfort. She has modified her workout routine to include leg presses and other exercises that do not compress the spine. She admits to neglecting core exercises. She reports no numbness or tingling sensations. She has always been aware of her body and has never been obese. She reports no nerve pain. She does not own a TENS unit. She is currently on Zoloft 100 mg and wishes to continue this medication.    She had an IUD placed in January 2023 and has not experienced any complications. She reports no symptoms of pregnancy.    SOCIAL HISTORY  She does not smoke or drink.    FAMILY HISTORY  Her mother has spinal stenosis, spondylolisthesis, and degenerative disc disease.    MEDICATIONS  Current: Zoloft    History of Present Illness    Objective   Vital Signs:  /72   Pulse 78   Resp 18   Ht 175.3 cm (69.02\")   Wt 83.3 kg (183 lb 9.6 oz)   SpO2 98%   BMI 27.10 kg/m²   Estimated body mass index is 27.1 kg/m² as calculated from the following:    Height as of this encounter: 175.3 cm (69.02\").    Weight as of " this encounter: 83.3 kg (183 lb 9.6 oz).            Physical Exam  Constitutional:       Appearance: Normal appearance.   HENT:      Head: Normocephalic and atraumatic.      Nose: Nose normal.      Mouth/Throat:      Mouth: Mucous membranes are moist.   Eyes:      General: Lids are normal.      Conjunctiva/sclera: Conjunctivae normal.   Pulmonary:      Effort: Pulmonary effort is normal.   Musculoskeletal:      Cervical back: Normal range of motion.      Lumbar back: Spasms and tenderness present. No deformity or bony tenderness.        Back:    Skin:     General: Skin is warm and dry.   Neurological:      General: No focal deficit present.      Mental Status: She is alert and oriented to person, place, and time.      Gait: Gait is intact.   Psychiatric:         Mood and Affect: Mood normal.         Behavior: Behavior normal.         Thought Content: Thought content normal.        Result Review :               Results         Assessment and Plan        1. Back pain.  The pain is likely due to irritated paraspinal muscles and tendons. There is no evidence of lymph node involvement or malignancy. The pain is not indicative of renal issues. She is advised to use a TENS unit for pain relief and consider acupuncture, dry needling, or myofascial release therapy. A soft tissue ultrasound will be ordered to rule out the presence of lipomas or abnormal lymph nodes.    2. Anxiety.  She is currently taking Zoloft 100 mg.    3. Health Maintenance.  Her last Pap smear was conducted in 2023, and she is due for her next one in 2026. An IUD was placed in January 2023, and it is expected to last for 5 years.    PROCEDURE  IUD insertion in January 2023.    Diagnoses and all orders for this visit:    1. Subcutaneous nodule (Primary)  -     US Soft Tissue; Future    2. Controlled substance agreement signed  -     Urine Drug Screen - Urine, Clean Catch    3. Chronic bilateral low back pain without sciatica             Follow Up   No  follow-ups on file.  Patient was given instructions and counseling regarding her condition or for health maintenance advice. Please see specific information pulled into the AVS if appropriate.     Patient or patient representative verbalized consent for the use of Ambient Listening during the visit with  Meryl Ansari MD for chart documentation. 2/14/2025  14:32 EST

## 2025-02-15 LAB
AMPHETAMINES UR QL SCN: NEGATIVE NG/ML
BARBITURATES UR QL SCN: NEGATIVE NG/ML
BENZODIAZ UR QL SCN: NEGATIVE NG/ML
BZE UR QL SCN: NEGATIVE NG/ML
CANNABINOIDS UR QL SCN: NEGATIVE NG/ML
CREAT UR-MCNC: 24.7 MG/DL (ref 20–300)
LABORATORY COMMENT REPORT: NORMAL
METHADONE UR QL SCN: NEGATIVE NG/ML
OPIATES UR QL SCN: NEGATIVE NG/ML
OXYCODONE+OXYMORPHONE UR QL SCN: NEGATIVE NG/ML
PCP UR QL: NEGATIVE NG/ML
PH UR: 6.6 [PH] (ref 4.5–8.9)
PROPOXYPH UR QL SCN: NEGATIVE NG/ML

## 2025-02-28 ENCOUNTER — HOSPITAL ENCOUNTER (OUTPATIENT)
Dept: ULTRASOUND IMAGING | Facility: HOSPITAL | Age: 30
Discharge: HOME | End: 2025-02-28
Payer: MEDICAID

## 2025-02-28 DIAGNOSIS — R22.9 SUBCUTANEOUS NODULE: ICD-10-CM

## 2025-02-28 PROCEDURE — 76857 US EXAM PELVIC LIMITED: CPT

## 2025-04-28 DIAGNOSIS — F98.8 ATTENTION DEFICIT DISORDER (ADD) WITHOUT HYPERACTIVITY: Chronic | ICD-10-CM

## 2025-04-28 RX ORDER — SERTRALINE HYDROCHLORIDE 100 MG/1
100 TABLET, FILM COATED ORAL DAILY
Qty: 90 TABLET | Refills: 3 | Status: SHIPPED | OUTPATIENT
Start: 2025-04-28 | End: 2026-04-23

## 2025-04-28 RX ORDER — DEXTROAMPHETAMINE SACCHARATE, AMPHETAMINE ASPARTATE, DEXTROAMPHETAMINE SULFATE AND AMPHETAMINE SULFATE 5; 5; 5; 5 MG/1; MG/1; MG/1; MG/1
20 TABLET ORAL 2 TIMES DAILY
Qty: 60 TABLET | Refills: 0 | Status: SHIPPED | OUTPATIENT
Start: 2025-04-28

## 2025-04-28 NOTE — TELEPHONE ENCOUNTER
Caller: Neva Whitaker    Relationship: Self    Best call back number: [phone]     Requested Prescriptions:   Requested Prescriptions     Pending Prescriptions Disp Refills    amphetamine-dextroamphetamine (ADDERALL) 20 MG tablet 60 tablet 0     Sig: Take 1 tablet by mouth 2 (Two) Times a Day.    sertraline (Zoloft) 100 MG tablet 90 tablet 3     Sig: Take 1 tablet by mouth Daily for 360 days.        [id number] [place] [address] - [phone]  - [phone] FX     Last office visit with prescribing clinician: 2/14/2025   Last telemedicine visit with prescribing clinician: Visit date not found   Next office visit with prescribing clinician: Visit date not found     Additional details provided by patient: STATES SHE IS OUT OF MEDICATION.    Does the patient have less than a 3 day supply:  [x] Yes  [] No    Would you like a call back once the refill request has been completed: [] Yes [] No    If the office needs to give you a call back, can they leave a voicemail: [] Yes [] No    Shea Rodriguez, MigelSched Rep   04/28/25 14:42 EDT     PLEASE ADVISE.

## 2025-05-27 ENCOUNTER — OFFICE VISIT (OUTPATIENT)
Dept: OBSTETRICS AND GYNECOLOGY | Age: 30
End: 2025-05-27
Payer: MEDICAID

## 2025-05-27 VITALS
SYSTOLIC BLOOD PRESSURE: 118 MMHG | HEIGHT: 69 IN | BODY MASS INDEX: 26.93 KG/M2 | WEIGHT: 181.8 LBS | DIASTOLIC BLOOD PRESSURE: 68 MMHG

## 2025-05-27 DIAGNOSIS — Z01.419 ENCOUNTER FOR GYNECOLOGICAL EXAMINATION: Primary | ICD-10-CM

## 2025-05-27 NOTE — PROGRESS NOTES
Subjective       History of Present Illness    Chief Complaint   Patient presents with    Gynecologic Exam     Ae last pap (-)20 cc: no complaints today        Neva Whitaker is a 29 y.o. female who presents for annual exam.  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    Her menses are regular every 28-30 days, lasting 0-3 days.    Current contraception: IUD  History of abnormal Pap smear: no  Family history of uterine or ovarian cancer: no  Family history of breast cancer: no    Mammogram: not indicated.  Colonoscopy: not indicated.  DEXA: not indicated.  Last Pap:    Social History    Tobacco Use      Smoking status: Never      Smokeless tobacco: Never    Past Medical History:   Diagnosis Date    Chlamydia     ADHD (attention deficit hyperactivity disorder)     Anxiety       Past Surgical History:   Procedure Laterality Date    BREAST AUGMENTATION      TONSILLECTOMY          The following portions of the patient's history were reviewed and updated as appropriate: allergies, current medications, past family history, past medical history, past social history, past surgical history, and problem list.    Review of Systems   Constitutional: Negative.    HENT: Negative.     Eyes: Negative.    Respiratory: Negative.     Cardiovascular: Negative.    Gastrointestinal: Negative.    Endocrine: Negative.    Genitourinary: Negative.    Musculoskeletal: Negative.    Skin: Negative.   "  Allergic/Immunologic: Negative.    Neurological: Negative.    Hematological: Negative.    Psychiatric/Behavioral: Negative.           Objective   Physical Exam  Vitals reviewed.   Constitutional:       Appearance: She is well-developed.   Neck:      Thyroid: No thyroid mass.   Cardiovascular:      Rate and Rhythm: Normal rate and regular rhythm.      Heart sounds: Normal heart sounds.   Pulmonary:      Effort: Pulmonary effort is normal.      Breath sounds: Normal breath sounds.   Chest:   Breasts:     Right: No mass, nipple discharge, skin change or tenderness.      Left: No mass, nipple discharge, skin change or tenderness.   Abdominal:      Palpations: Abdomen is soft.      Tenderness: There is no abdominal tenderness.   Genitourinary:     Labia:         Right: No rash or lesion.         Left: No rash or lesion.       Vagina: Normal.      Cervix: No cervical motion tenderness, discharge or friability.      Adnexa:         Right: No mass or tenderness.          Left: No mass or tenderness.        Comments: IUD string present  Neurological:      Mental Status: She is alert and oriented to person, place, and time.   Psychiatric:         Behavior: Behavior normal.       /68   Ht 175.3 cm (69.02\")   Wt 82.5 kg (181 lb 12.8 oz)   BMI 26.83 kg/m²     Assessment & Plan   Diagnoses and all orders for this visit:    1. Encounter for gynecological examination (Primary)  -     IGP,CtNg,rfx Apt HPV All Pth          Breast self exam technique reviewed and patient encouraged to perform self-exam monthly.  Discussed healthy lifestyle modifications.  Pap smear done with gc/chl  Recommended 30 minutes of aerobic exercise five times per week.  Discussed calcium needs to prevent osteoporosis         "

## 2025-05-28 ENCOUNTER — TELEPHONE (OUTPATIENT)
Dept: FAMILY MEDICINE CLINIC | Facility: CLINIC | Age: 30
End: 2025-05-28
Payer: MEDICAID

## 2025-05-28 NOTE — TELEPHONE ENCOUNTER
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      PATIENT IS REQUESTING THAT THIS LETTER IS ADDED TO THE LETTERS TAB OF HER MYCHART.

## 2025-05-29 LAB
C TRACH RRNA CVX QL NAA+PROBE: NEGATIVE
CONV .: NORMAL
CYTOLOGIST CVX/VAG CYTO: NORMAL
CYTOLOGY CVX/VAG DOC CYTO: NORMAL
CYTOLOGY CVX/VAG DOC THIN PREP: NORMAL
DX ICD CODE: NORMAL
N GONORRHOEA RRNA CVX QL NAA+PROBE: NEGATIVE
OTHER STN SPEC: NORMAL
SERVICE CMNT-IMP: NORMAL
STAT OF ADQ CVX/VAG CYTO-IMP: NORMAL